# Patient Record
Sex: MALE | Race: WHITE | Employment: OTHER | URBAN - METROPOLITAN AREA
[De-identification: names, ages, dates, MRNs, and addresses within clinical notes are randomized per-mention and may not be internally consistent; named-entity substitution may affect disease eponyms.]

---

## 2017-03-24 ENCOUNTER — HOSPITAL ENCOUNTER (EMERGENCY)
Age: 82
Discharge: HOME | End: 2017-03-24
Attending: EMERGENCY MEDICINE
Payer: MEDICARE

## 2017-03-24 ENCOUNTER — APPOINTMENT (OUTPATIENT)
Dept: CT IMAGING | Age: 82
End: 2017-03-24
Attending: EMERGENCY MEDICINE
Payer: MEDICARE

## 2017-03-24 VITALS
HEART RATE: 71 BPM | TEMPERATURE: 97.2 F | DIASTOLIC BLOOD PRESSURE: 81 MMHG | OXYGEN SATURATION: 99 % | BODY MASS INDEX: 26.07 KG/M2 | SYSTOLIC BLOOD PRESSURE: 145 MMHG | WEIGHT: 176 LBS | HEIGHT: 69 IN | RESPIRATION RATE: 16 BRPM

## 2017-03-24 DIAGNOSIS — M25.511 ACUTE PAIN OF RIGHT SHOULDER: Primary | ICD-10-CM

## 2017-03-24 LAB
ALBUMIN SERPL BCP-MCNC: 3.5 G/DL (ref 3.5–5)
ALBUMIN/GLOB SERPL: 1.1 {RATIO} (ref 1.1–2.2)
ALP SERPL-CCNC: 58 U/L (ref 45–117)
ALT SERPL-CCNC: 18 U/L (ref 12–78)
ANION GAP BLD CALC-SCNC: 6 MMOL/L (ref 5–15)
AST SERPL W P-5'-P-CCNC: 14 U/L (ref 15–37)
BASOPHILS # BLD AUTO: 0 K/UL (ref 0–0.1)
BASOPHILS # BLD: 1 % (ref 0–1)
BILIRUB SERPL-MCNC: 0.5 MG/DL (ref 0.2–1)
BUN SERPL-MCNC: 18 MG/DL (ref 6–20)
BUN/CREAT SERPL: 15 (ref 12–20)
CALCIUM SERPL-MCNC: 8.9 MG/DL (ref 8.5–10.1)
CHLORIDE SERPL-SCNC: 102 MMOL/L (ref 97–108)
CO2 SERPL-SCNC: 31 MMOL/L (ref 21–32)
CREAT SERPL-MCNC: 1.21 MG/DL (ref 0.7–1.3)
EOSINOPHIL # BLD: 0.1 K/UL (ref 0–0.4)
EOSINOPHIL NFR BLD: 2 % (ref 0–7)
ERYTHROCYTE [DISTWIDTH] IN BLOOD BY AUTOMATED COUNT: 14.7 % (ref 11.5–14.5)
GLOBULIN SER CALC-MCNC: 3.2 G/DL (ref 2–4)
GLUCOSE SERPL-MCNC: 95 MG/DL (ref 65–100)
HCT VFR BLD AUTO: 46 % (ref 36.6–50.3)
HGB BLD-MCNC: 14.7 G/DL (ref 12.1–17)
LYMPHOCYTES # BLD AUTO: 13 % (ref 12–49)
LYMPHOCYTES # BLD: 0.8 K/UL (ref 0.8–3.5)
MCH RBC QN AUTO: 29.1 PG (ref 26–34)
MCHC RBC AUTO-ENTMCNC: 32 G/DL (ref 30–36.5)
MCV RBC AUTO: 90.9 FL (ref 80–99)
MONOCYTES # BLD: 0.6 K/UL (ref 0–1)
MONOCYTES NFR BLD AUTO: 11 % (ref 5–13)
NEUTS SEG # BLD: 4.4 K/UL (ref 1.8–8)
NEUTS SEG NFR BLD AUTO: 73 % (ref 32–75)
PLATELET # BLD AUTO: 161 K/UL (ref 150–400)
POTASSIUM SERPL-SCNC: 4.5 MMOL/L (ref 3.5–5.1)
PROT SERPL-MCNC: 6.7 G/DL (ref 6.4–8.2)
RBC # BLD AUTO: 5.06 M/UL (ref 4.1–5.7)
SODIUM SERPL-SCNC: 139 MMOL/L (ref 136–145)
TROPONIN I SERPL-MCNC: <0.04 NG/ML
TROPONIN I SERPL-MCNC: <0.04 NG/ML
WBC # BLD AUTO: 5.9 K/UL (ref 4.1–11.1)

## 2017-03-24 PROCEDURE — 84484 ASSAY OF TROPONIN QUANT: CPT | Performed by: EMERGENCY MEDICINE

## 2017-03-24 PROCEDURE — 74011636320 HC RX REV CODE- 636/320: Performed by: EMERGENCY MEDICINE

## 2017-03-24 PROCEDURE — 99283 EMERGENCY DEPT VISIT LOW MDM: CPT

## 2017-03-24 PROCEDURE — 36415 COLL VENOUS BLD VENIPUNCTURE: CPT | Performed by: EMERGENCY MEDICINE

## 2017-03-24 PROCEDURE — 93005 ELECTROCARDIOGRAM TRACING: CPT

## 2017-03-24 PROCEDURE — 80053 COMPREHEN METABOLIC PANEL: CPT | Performed by: EMERGENCY MEDICINE

## 2017-03-24 PROCEDURE — 71275 CT ANGIOGRAPHY CHEST: CPT

## 2017-03-24 PROCEDURE — 74011000258 HC RX REV CODE- 258: Performed by: EMERGENCY MEDICINE

## 2017-03-24 PROCEDURE — 85025 COMPLETE CBC W/AUTO DIFF WBC: CPT | Performed by: EMERGENCY MEDICINE

## 2017-03-24 RX ORDER — HYDROCODONE BITARTRATE AND ACETAMINOPHEN 5; 325 MG/1; MG/1
1 TABLET ORAL
Qty: 20 TAB | Refills: 0 | Status: SHIPPED | OUTPATIENT
Start: 2017-03-24 | End: 2017-11-30

## 2017-03-24 RX ORDER — SODIUM CHLORIDE 0.9 % (FLUSH) 0.9 %
10 SYRINGE (ML) INJECTION
Status: COMPLETED | OUTPATIENT
Start: 2017-03-24 | End: 2017-03-24

## 2017-03-24 RX ADMIN — SODIUM CHLORIDE 100 ML: 900 INJECTION, SOLUTION INTRAVENOUS at 18:39

## 2017-03-24 RX ADMIN — IOPAMIDOL 100 ML: 755 INJECTION, SOLUTION INTRAVENOUS at 18:39

## 2017-03-24 RX ADMIN — Medication 10 ML: at 18:39

## 2017-03-24 NOTE — ED TRIAGE NOTES
TRIAGE NOTE: \"I was riding in the car and all of a sudden I had this pain run across my right shoulder to my shoulder blades. It's never happened before. \"

## 2017-03-24 NOTE — ED PROVIDER NOTES
HPI Comments: 80 y.o. male with past medical history significant for peripheral neuropathy, recurrent herpes simplex, infarction of lymph nodes, prostate CA- prostatectomy, TIA, melanoma, pacemaker, hypercholesterolemia, CAD, seizure disorder; not active, nephrolithiasis, hypothyroidism, herniated lumbar, endoscopy, skull fx, green light procedure, cardiac catheterization who presents accompanied by spouse with chief complaint of shoulder pain. Patient states he was riding in the car with his spouse when he had sudden onset of right shoulder pain. He notes he only has pain with movement and reports exacerbation with deep breathing and ranging shoulder. He denies any hx of similar sx. Patient explains that he is usually very healthy and decided to get it \"checked out\" out of worry that he has \"never felt anything like it before\". Patient denies any SOB, fever, chills, visual disturbance, nausea, vomiting, dizziness, HA, neck pain, cough, congestion. There are no other acute medical concerns at this time. Social hx: non-smoker. Denies ETOH use. PCP: Javed Sweet MD    Note written by Kartik Quinn, as dictated by Nilay Jay MD 6:15 PM      The history is provided by the patient and the spouse. No  was used. Past Medical History:   Diagnosis Date    Artificial pacemaker     (atrial)  currently NSR    CAD (coronary artery disease)     ECG: old myocardial infarction 4/23/12    inferior Q's, NSR    Exercise habits 2012    runs in [place] daily    H/O echocardiogram 7/2011    EF= 50-55%, nl. fxn.     Hypercholesterolemia 1992    Hypothyroidism     Infarction of lymph node/vessel [id number]    WIFE UNSURE OF THIS STATES HAS STONE IN SALIVARY GLAND    Lumbar herniated disc     Melanoma ([address] 1991    chest    Nephrolithiasis 4/2010    Peripheral neuropathy ([address] 4/21/2010    Prostate cancer ([address] 2005    TURP/seeds     Recurrent herpes simplex 4/21/2010    Seizure disorder (?) 4/21/2010    not active NOT HAD SEIZURE IN 50 YEARS PER WIFE    TIA (?) 7/2011    fainting spell >> pacemaker       Past Surgical History:   Procedure Laterality Date    CARDIAC SURG PROCEDURE UNLIST      HOLE IN HEART FIXED ASD    ENDOSCOPY, COLON, DIAGNOSTIC  9/05    HX CATARACT REMOVAL      LUCIA.  HX HEART CATHETERIZATION      3 stents    HX PACEMAKER  2011    sss ppm st judes    HX PROSTATECTOMY  3/05    green light TURP/RADIOACTIVE SEEDS    HX TONSILLECTOMY      HX UROLOGICAL      urinary stricture    NEUROLOGICAL PROCEDURE UNLISTED  age 12    skull fracture         Family History:   Problem Relation Age of Onset    Heart Disease Mother      MI   [place] Amen Hypertension Father     Anesth Problems Neg Hx        Social History     Social History    Marital status:      Spouse name: N/A    Number of children: N/A    Years of education: N/A     Occupational History    Not on file. Social History Main Topics    Smoking status: Never Smoker    Smokeless tobacco: Never Used    Alcohol use No    Drug use: Not on file    Sexual activity: Not on file     Other Topics Concern    Not on file     Social History Narrative         ALLERGIES: Review of patient's allergies indicates no known allergies. Review of Systems   Constitutional: Negative for activity change, appetite change, chills, fatigue and fever. HENT: Negative for ear pain, facial swelling, sore throat and trouble swallowing. Eyes: Negative for pain, discharge and visual disturbance. Respiratory: Negative for cough, chest tightness, shortness of breath and wheezing. Cardiovascular: Negative for chest pain and palpitations. Gastrointestinal: Negative for abdominal pain, blood in stool, nausea and vomiting. Genitourinary: Negative for difficulty urinating, flank pain and hematuria. Musculoskeletal: Positive for arthralgias (right shoulder). Negative for joint swelling, myalgias and neck pain.    Skin: Negative for color change and rash. Neurological: Negative for dizziness, weakness, numbness and headaches. Hematological: Negative for adenopathy. Does not bruise/bleed easily. Psychiatric/Behavioral: Negative for behavioral problems, confusion and sleep disturbance. All other systems reviewed and are negative. Vitals:    03/24/17 1656   BP: 145/81   Pulse: 71   Resp: 16   Temp: 97.2 °F (36.2 °C)   SpO2: 95%   Weight: 79.8 kg (176 lb)   Height: 5' 9\" (1.753 m)            Physical Exam   Constitutional: He is oriented to person, place, and time. He appears well-developed and well-nourished. No distress. HENT:   Head: Normocephalic and atraumatic. Nose: Nose normal.   Mouth/Throat: Oropharynx is clear and moist.   Eyes: Conjunctivae and EOM are normal. Pupils are equal, round, and reactive to light. No scleral icterus. Neck: Normal range of motion. Neck supple. No JVD present. No tracheal deviation present. No thyromegaly present. No carotid bruits noted. Cardiovascular: Normal rate, regular rhythm, normal heart sounds and intact distal pulses. Exam reveals no gallop and no friction rub. No murmur heard. Pulmonary/Chest: Effort normal and breath sounds normal. No respiratory distress. He has no wheezes. He has no rales. He exhibits no tenderness. Pulses are equal bilaterally. Abdominal: Soft. Bowel sounds are normal. He exhibits no distension and no mass. There is no tenderness. There is no rebound and no guarding. Musculoskeletal: Normal range of motion. He exhibits no edema or tenderness. Unable to reproduce shoulder pain with palpation   Lymphadenopathy:     He has no cervical adenopathy. Neurological: He is alert and oriented to person, place, and time. He has normal reflexes. No cranial nerve deficit. Coordination normal.   Skin: Skin is warm and dry. No rash noted. No erythema. Psychiatric: He has a normal mood and affect.  His behavior is normal. Judgment and thought content normal.   Nursing note and vitals reviewed. Note written by Anabel Soria, as dictated by Son Valdovinos MD 6:15 PM        MDM  Number of Diagnoses or Management Options  Acute pain of right shoulder: new and requires workup     Amount and/or Complexity of Data Reviewed  Clinical lab tests: ordered and reviewed  Tests in the radiology section of CPT®: ordered and reviewed  Decide to obtain previous medical records or to obtain history from someone other than the patient: yes  Review and summarize past medical records: yes  Discuss the patient with other providers: yes  Independent visualization of images, tracings, or specimens: yes    Risk of Complications, Morbidity, and/or Mortality  Presenting problems: high  Diagnostic procedures: high  Management options: high    Patient Progress  Patient progress: stable    ED Course       Procedures    ED EKG interpretation:  Rhythm: paced; and regular . Rate (approx.): 70; Axis: left axis deviation; normal intervals. ST/T wave: non-specific changes; poor R wave. No acute changes. Note written by Anabel Soria, as dictated by Son Valdovinos MD 6:16 PM    PROGRESS NOTE:    Discussed the CT with the radiologist. Will do the PE study to look for both clot and dissection. 7:23 PM  CT normal. Will repeat Troponin. If repeat Troponin is normal, will discharge with treatment for MS pain. 8:15 PM  Still awaiting Troponin report. the repeat troponin was normal. Patient will be discharged home with symptomatic treatment and further evaluation by his own physician And return if any worsening symptoms.

## 2017-03-24 NOTE — Clinical Note
Home to rest and use the medications as directed for pain. Warm moist compresses to the areas that are painful four times a day for 20 minutes at a time. See your own MD if continued difficulty.

## 2017-03-25 LAB
ATRIAL RATE: 68 BPM
CALCULATED P AXIS, ECG09: -2 DEGREES
CALCULATED R AXIS, ECG10: -32 DEGREES
CALCULATED T AXIS, ECG11: -19 DEGREES
DIAGNOSIS, 93000: NORMAL
P-R INTERVAL, ECG05: 238 MS
Q-T INTERVAL, ECG07: 424 MS
QRS DURATION, ECG06: 92 MS
QTC CALCULATION (BEZET), ECG08: 457 MS
VENTRICULAR RATE, ECG03: 70 BPM

## 2017-11-30 ENCOUNTER — APPOINTMENT (OUTPATIENT)
Dept: CT IMAGING | Age: 82
DRG: 689 | End: 2017-11-30
Attending: STUDENT IN AN ORGANIZED HEALTH CARE EDUCATION/TRAINING PROGRAM
Payer: MEDICARE

## 2017-11-30 ENCOUNTER — HOSPITAL ENCOUNTER (INPATIENT)
Age: 82
LOS: 4 days | Discharge: NURSING, REHAB OR LONG-TERM CARE | DRG: 689 | End: 2017-12-04
Attending: STUDENT IN AN ORGANIZED HEALTH CARE EDUCATION/TRAINING PROGRAM | Admitting: HOSPITALIST
Payer: MEDICARE

## 2017-11-30 DIAGNOSIS — M54.5 LOW BACK PAIN, UNSPECIFIED BACK PAIN LATERALITY, UNSPECIFIED CHRONICITY, WITH SCIATICA PRESENCE UNSPECIFIED: ICD-10-CM

## 2017-11-30 DIAGNOSIS — N39.0 URINARY TRACT INFECTION WITHOUT HEMATURIA, SITE UNSPECIFIED: Primary | ICD-10-CM

## 2017-11-30 DIAGNOSIS — R53.81 DEBILITY: ICD-10-CM

## 2017-11-30 LAB
ALBUMIN SERPL-MCNC: 3.2 G/DL (ref 3.5–5)
ALBUMIN/GLOB SERPL: 0.9 {RATIO} (ref 1.1–2.2)
ALP SERPL-CCNC: 57 U/L (ref 45–117)
ALT SERPL-CCNC: 18 U/L (ref 12–78)
ANION GAP SERPL CALC-SCNC: 5 MMOL/L (ref 5–15)
APPEARANCE UR: ABNORMAL
APTT PPP: 33.6 SEC (ref 22.1–32.5)
AST SERPL-CCNC: 20 U/L (ref 15–37)
ATRIAL RATE: 70 BPM
BACTERIA URNS QL MICRO: ABNORMAL /HPF
BASOPHILS # BLD: 0 K/UL (ref 0–0.1)
BASOPHILS NFR BLD: 0 % (ref 0–1)
BILIRUB SERPL-MCNC: 0.5 MG/DL (ref 0.2–1)
BILIRUB UR QL: NEGATIVE
BUN SERPL-MCNC: 14 MG/DL (ref 6–20)
BUN/CREAT SERPL: 13 (ref 12–20)
CALCIUM SERPL-MCNC: 8.7 MG/DL (ref 8.5–10.1)
CALCULATED P AXIS, ECG09: -29 DEGREES
CALCULATED R AXIS, ECG10: -31 DEGREES
CALCULATED T AXIS, ECG11: -29 DEGREES
CHLORIDE SERPL-SCNC: 102 MMOL/L (ref 97–108)
CO2 SERPL-SCNC: 31 MMOL/L (ref 21–32)
COLOR UR: ABNORMAL
CREAT SERPL-MCNC: 1.04 MG/DL (ref 0.7–1.3)
DIAGNOSIS, 93000: NORMAL
DIFFERENTIAL METHOD BLD: ABNORMAL
EOSINOPHIL # BLD: 0.1 K/UL (ref 0–0.4)
EOSINOPHIL NFR BLD: 1 % (ref 0–7)
EPITH CASTS URNS QL MICRO: ABNORMAL /LPF
ERYTHROCYTE [DISTWIDTH] IN BLOOD BY AUTOMATED COUNT: 14.2 % (ref 11.5–14.5)
GLOBULIN SER CALC-MCNC: 3.4 G/DL (ref 2–4)
GLUCOSE SERPL-MCNC: 100 MG/DL (ref 65–100)
GLUCOSE UR STRIP.AUTO-MCNC: NEGATIVE MG/DL
HCT VFR BLD AUTO: 46.7 % (ref 36.6–50.3)
HGB BLD-MCNC: 15.3 G/DL (ref 12.1–17)
HGB UR QL STRIP: ABNORMAL
INR PPP: 1.1 (ref 0.9–1.1)
KETONES UR QL STRIP.AUTO: NEGATIVE MG/DL
LEUKOCYTE ESTERASE UR QL STRIP.AUTO: ABNORMAL
LIPASE SERPL-CCNC: 123 U/L (ref 73–393)
LYMPHOCYTES # BLD: 0.7 K/UL (ref 0.8–3.5)
LYMPHOCYTES NFR BLD: 5 % (ref 12–49)
MCH RBC QN AUTO: 30.1 PG (ref 26–34)
MCHC RBC AUTO-ENTMCNC: 32.8 G/DL (ref 30–36.5)
MCV RBC AUTO: 91.9 FL (ref 80–99)
MONOCYTES # BLD: 1.2 K/UL (ref 0–1)
MONOCYTES NFR BLD: 9 % (ref 5–13)
NEUTS SEG # BLD: 11 K/UL (ref 1.8–8)
NEUTS SEG NFR BLD: 85 % (ref 32–75)
NITRITE UR QL STRIP.AUTO: POSITIVE
P-R INTERVAL, ECG05: 236 MS
PH UR STRIP: 6.5 [PH] (ref 5–8)
PLATELET # BLD AUTO: 158 K/UL (ref 150–400)
POTASSIUM SERPL-SCNC: 4.4 MMOL/L (ref 3.5–5.1)
PROT SERPL-MCNC: 6.6 G/DL (ref 6.4–8.2)
PROT UR STRIP-MCNC: NEGATIVE MG/DL
PROTHROMBIN TIME: 10.9 SEC (ref 9–11.1)
Q-T INTERVAL, ECG07: 420 MS
QRS DURATION, ECG06: 92 MS
QTC CALCULATION (BEZET), ECG08: 453 MS
RBC # BLD AUTO: 5.08 M/UL (ref 4.1–5.7)
RBC #/AREA URNS HPF: ABNORMAL /HPF (ref 0–5)
RBC MORPH BLD: ABNORMAL
SODIUM SERPL-SCNC: 138 MMOL/L (ref 136–145)
SP GR UR REFRACTOMETRY: 1.01 (ref 1–1.03)
THERAPEUTIC RANGE,PTTT: ABNORMAL SECS (ref 58–77)
TROPONIN I SERPL-MCNC: <0.04 NG/ML
UR CULT HOLD, URHOLD: NORMAL
UROBILINOGEN UR QL STRIP.AUTO: 0.2 EU/DL (ref 0.2–1)
VENTRICULAR RATE, ECG03: 70 BPM
WBC # BLD AUTO: 13 K/UL (ref 4.1–11.1)
WBC URNS QL MICRO: ABNORMAL /HPF (ref 0–4)

## 2017-11-30 PROCEDURE — 74011250637 HC RX REV CODE- 250/637: Performed by: HOSPITALIST

## 2017-11-30 PROCEDURE — 81001 URINALYSIS AUTO W/SCOPE: CPT | Performed by: STUDENT IN AN ORGANIZED HEALTH CARE EDUCATION/TRAINING PROGRAM

## 2017-11-30 PROCEDURE — 96361 HYDRATE IV INFUSION ADD-ON: CPT

## 2017-11-30 PROCEDURE — 80053 COMPREHEN METABOLIC PANEL: CPT | Performed by: STUDENT IN AN ORGANIZED HEALTH CARE EDUCATION/TRAINING PROGRAM

## 2017-11-30 PROCEDURE — G8978 MOBILITY CURRENT STATUS: HCPCS

## 2017-11-30 PROCEDURE — 87077 CULTURE AEROBIC IDENTIFY: CPT | Performed by: STUDENT IN AN ORGANIZED HEALTH CARE EDUCATION/TRAINING PROGRAM

## 2017-11-30 PROCEDURE — 97530 THERAPEUTIC ACTIVITIES: CPT

## 2017-11-30 PROCEDURE — 85730 THROMBOPLASTIN TIME PARTIAL: CPT | Performed by: STUDENT IN AN ORGANIZED HEALTH CARE EDUCATION/TRAINING PROGRAM

## 2017-11-30 PROCEDURE — 99285 EMERGENCY DEPT VISIT HI MDM: CPT

## 2017-11-30 PROCEDURE — 87186 SC STD MICRODIL/AGAR DIL: CPT | Performed by: STUDENT IN AN ORGANIZED HEALTH CARE EDUCATION/TRAINING PROGRAM

## 2017-11-30 PROCEDURE — G8979 MOBILITY GOAL STATUS: HCPCS

## 2017-11-30 PROCEDURE — 96365 THER/PROPH/DIAG IV INF INIT: CPT

## 2017-11-30 PROCEDURE — 85025 COMPLETE CBC W/AUTO DIFF WBC: CPT | Performed by: STUDENT IN AN ORGANIZED HEALTH CARE EDUCATION/TRAINING PROGRAM

## 2017-11-30 PROCEDURE — 72131 CT LUMBAR SPINE W/O DYE: CPT

## 2017-11-30 PROCEDURE — 74011000258 HC RX REV CODE- 258: Performed by: STUDENT IN AN ORGANIZED HEALTH CARE EDUCATION/TRAINING PROGRAM

## 2017-11-30 PROCEDURE — 51701 INSERT BLADDER CATHETER: CPT

## 2017-11-30 PROCEDURE — 97161 PT EVAL LOW COMPLEX 20 MIN: CPT

## 2017-11-30 PROCEDURE — 83690 ASSAY OF LIPASE: CPT | Performed by: STUDENT IN AN ORGANIZED HEALTH CARE EDUCATION/TRAINING PROGRAM

## 2017-11-30 PROCEDURE — 36415 COLL VENOUS BLD VENIPUNCTURE: CPT | Performed by: STUDENT IN AN ORGANIZED HEALTH CARE EDUCATION/TRAINING PROGRAM

## 2017-11-30 PROCEDURE — 74011250636 HC RX REV CODE- 250/636: Performed by: STUDENT IN AN ORGANIZED HEALTH CARE EDUCATION/TRAINING PROGRAM

## 2017-11-30 PROCEDURE — 85610 PROTHROMBIN TIME: CPT | Performed by: STUDENT IN AN ORGANIZED HEALTH CARE EDUCATION/TRAINING PROGRAM

## 2017-11-30 PROCEDURE — 84484 ASSAY OF TROPONIN QUANT: CPT | Performed by: STUDENT IN AN ORGANIZED HEALTH CARE EDUCATION/TRAINING PROGRAM

## 2017-11-30 PROCEDURE — 65270000032 HC RM SEMIPRIVATE

## 2017-11-30 PROCEDURE — 93005 ELECTROCARDIOGRAM TRACING: CPT

## 2017-11-30 PROCEDURE — 77030011943

## 2017-11-30 PROCEDURE — 87086 URINE CULTURE/COLONY COUNT: CPT | Performed by: STUDENT IN AN ORGANIZED HEALTH CARE EDUCATION/TRAINING PROGRAM

## 2017-11-30 PROCEDURE — 96375 TX/PRO/DX INJ NEW DRUG ADDON: CPT

## 2017-11-30 PROCEDURE — 74011250636 HC RX REV CODE- 250/636: Performed by: HOSPITALIST

## 2017-11-30 RX ORDER — LEVOTHYROXINE SODIUM 50 UG/1
50 TABLET ORAL
COMMUNITY

## 2017-11-30 RX ORDER — OXYBUTYNIN CHLORIDE 5 MG/1
5 TABLET, EXTENDED RELEASE ORAL
Status: DISCONTINUED | OUTPATIENT
Start: 2017-11-30 | End: 2017-12-04 | Stop reason: HOSPADM

## 2017-11-30 RX ORDER — SODIUM CHLORIDE 0.9 % (FLUSH) 0.9 %
5-10 SYRINGE (ML) INJECTION AS NEEDED
Status: DISCONTINUED | OUTPATIENT
Start: 2017-11-30 | End: 2017-12-04 | Stop reason: HOSPADM

## 2017-11-30 RX ORDER — HYDROMORPHONE HYDROCHLORIDE 2 MG/ML
0.5 INJECTION, SOLUTION INTRAMUSCULAR; INTRAVENOUS; SUBCUTANEOUS ONCE
Status: COMPLETED | OUTPATIENT
Start: 2017-11-30 | End: 2017-11-30

## 2017-11-30 RX ORDER — LEVOTHYROXINE SODIUM 50 UG/1
100 TABLET ORAL
Status: DISCONTINUED | OUTPATIENT
Start: 2017-12-01 | End: 2017-12-04 | Stop reason: HOSPADM

## 2017-11-30 RX ORDER — MEMANTINE HYDROCHLORIDE 10 MG/1
10 TABLET ORAL 2 TIMES DAILY
Status: DISCONTINUED | OUTPATIENT
Start: 2017-11-30 | End: 2017-12-04 | Stop reason: HOSPADM

## 2017-11-30 RX ORDER — SODIUM CHLORIDE 0.9 % (FLUSH) 0.9 %
5-10 SYRINGE (ML) INJECTION EVERY 8 HOURS
Status: DISCONTINUED | OUTPATIENT
Start: 2017-11-30 | End: 2017-12-04 | Stop reason: HOSPADM

## 2017-11-30 RX ORDER — LEVOTHYROXINE SODIUM 50 UG/1
100 TABLET ORAL
COMMUNITY

## 2017-11-30 RX ORDER — SIMVASTATIN 10 MG/1
10 TABLET, FILM COATED ORAL
COMMUNITY

## 2017-11-30 RX ORDER — CAPSAICIN 0.03 G/100G
CREAM TOPICAL 3 TIMES DAILY
Status: DISCONTINUED | OUTPATIENT
Start: 2017-11-30 | End: 2017-12-04 | Stop reason: HOSPADM

## 2017-11-30 RX ORDER — SIMVASTATIN 10 MG/1
10 TABLET, FILM COATED ORAL
Status: DISCONTINUED | OUTPATIENT
Start: 2017-11-30 | End: 2017-12-04 | Stop reason: HOSPADM

## 2017-11-30 RX ORDER — ASPIRIN 81 MG/1
81 TABLET ORAL DAILY
COMMUNITY

## 2017-11-30 RX ORDER — LEVOTHYROXINE SODIUM 50 UG/1
50 TABLET ORAL
Status: DISCONTINUED | OUTPATIENT
Start: 2017-12-02 | End: 2017-12-04 | Stop reason: HOSPADM

## 2017-11-30 RX ORDER — DONEPEZIL HYDROCHLORIDE 10 MG/1
10 TABLET, FILM COATED ORAL
Status: DISCONTINUED | OUTPATIENT
Start: 2017-12-01 | End: 2017-12-01

## 2017-11-30 RX ORDER — SODIUM CHLORIDE 9 MG/ML
75 INJECTION, SOLUTION INTRAVENOUS CONTINUOUS
Status: DISCONTINUED | OUTPATIENT
Start: 2017-11-30 | End: 2017-12-01

## 2017-11-30 RX ORDER — ACETAMINOPHEN 500 MG
500 TABLET ORAL
Status: DISCONTINUED | OUTPATIENT
Start: 2017-11-30 | End: 2017-12-04 | Stop reason: HOSPADM

## 2017-11-30 RX ORDER — SOTALOL HYDROCHLORIDE 80 MG/1
80 TABLET ORAL 2 TIMES DAILY
Status: DISCONTINUED | OUTPATIENT
Start: 2017-11-30 | End: 2017-12-04 | Stop reason: HOSPADM

## 2017-11-30 RX ORDER — ASPIRIN 81 MG/1
81 TABLET ORAL DAILY
Status: DISCONTINUED | OUTPATIENT
Start: 2017-12-01 | End: 2017-12-04 | Stop reason: HOSPADM

## 2017-11-30 RX ADMIN — HYDROMORPHONE HYDROCHLORIDE 0.5 MG: 2 INJECTION INTRAMUSCULAR; INTRAVENOUS; SUBCUTANEOUS at 08:21

## 2017-11-30 RX ADMIN — CEFTRIAXONE 1 G: 1 INJECTION, POWDER, FOR SOLUTION INTRAMUSCULAR; INTRAVENOUS at 10:21

## 2017-11-30 RX ADMIN — SIMVASTATIN 10 MG: 10 TABLET, FILM COATED ORAL at 22:30

## 2017-11-30 RX ADMIN — Medication 10 ML: at 22:30

## 2017-11-30 RX ADMIN — APIXABAN 5 MG: 5 TABLET, FILM COATED ORAL at 22:30

## 2017-11-30 RX ADMIN — MEMANTINE 10 MG: 10 TABLET ORAL at 18:01

## 2017-11-30 RX ADMIN — ACETAMINOPHEN 500 MG: 500 TABLET, FILM COATED ORAL at 18:02

## 2017-11-30 RX ADMIN — Medication 10 ML: at 13:14

## 2017-11-30 RX ADMIN — CAPSAICIN: 0.25 CREAM TOPICAL at 17:02

## 2017-11-30 RX ADMIN — SODIUM CHLORIDE 500 ML: 900 INJECTION, SOLUTION INTRAVENOUS at 08:21

## 2017-11-30 RX ADMIN — SOTALOL HYDROCHLORIDE 80 MG: 80 TABLET ORAL at 18:04

## 2017-11-30 RX ADMIN — CAPSAICIN: 0.25 CREAM TOPICAL at 22:41

## 2017-11-30 RX ADMIN — SODIUM CHLORIDE 75 ML/HR: 900 INJECTION, SOLUTION INTRAVENOUS at 13:15

## 2017-11-30 RX ADMIN — OXYBUTYNIN CHLORIDE 5 MG: 5 TABLET, EXTENDED RELEASE ORAL at 22:30

## 2017-11-30 NOTE — ED NOTES
Pt brief noted to be soiled with urine, pt's brief changed, pericare provided, pt repositioned in stretcher and provided with pillows for comfort. Pt O2 sats noted to be 88% on RA after administration of dilaudid. Pt placed on 2L NC and sats up to 95%.

## 2017-11-30 NOTE — ED TRIAGE NOTES
Pt from home via EMS for evaluation of lower back pain for two days, no reported fall. Per family pt has hx of herniated discs.

## 2017-11-30 NOTE — PROGRESS NOTES
Problem: Mobility Impaired (Adult and Pediatric)  Goal: *Acute Goals and Plan of Care (Insert Text)  Physical Therapy Goals  Initiated 11/30/2017  1. Patient will move from supine to sit and sit to supine  in bed with moderate assistance  within 7 day(s). 2.  Patient will transfer from bed to chair and chair to bed with minimal assistance  using the least restrictive device within 7 day(s). 3.  Patient will perform sit to stand with minimal assistance within 7 day(s). 4.  Patient will ambulate with minimal assistance/contact guard assist for 50 feet with the least restrictive device within 7 day(s). physical Therapy Emergency Department EVALUATION with recommended admission  Patient: Umang Leblanc (53 y.o. male)  Date: 11/30/2017  Primary Diagnosis: There are no admission diagnoses documented for this encounter. Precautions: [place], deaf in R ear, hearing aid in the L     ASSESSMENT :  Chart reviewed. Patient cleared to be seen by nursing staff. Patient presents to the ED s/o a few days of declining functional status, back pain, and reports of R leg pain. Ct of the lumbar spine revealing: Severe degenerative changes of all lumbar disc levels. This is most significant at L5-S1 with moderate left foraminal narrowing which has slightly  progressed. No acute fracture. Wife works during that day and patient sleeps and spends his days in the recliner. Pt also has an elevated white count and new UTI. Wife noticing that patient is not getting up anymore whereas he previously ambulated with the RW to the kitchen and bathroom as needed mod independently. Based on the objective data described below, the patient presents with increased confusion, poor motor planning, increased fall risk, and decline in functional independence. Speaking loudly and into the R ear, patient having difficulty with command following. Patient demonstrating good strength by performing SLR and heelslides in supine on both legs.  No pain reported. However when attempting bed mobility, patient unable to lift his legs to bring them towards the EOB, even with cueing, prompting and additional time. Patient is max assist supine to sit. Initially pushing retropulsively in sitting needing constant support. Unable to coordinate using UEs to push forward to get feet on the floor. However once assisted into optimal seated position, patient able to sit without assist. Patient becoming frustrated with cueing and education. [place] Serum supported as patient pulled to standing with mod/max assist. Patient leaning posteriorly needing constant assist. Focus on anterior weight shifting in preparation for gait with minimal effect. Unable to clear feet during stepping attempt. Max assist x 2 to recliner where patient feels more comfortable sitting up rather than being supine. Wife present to monitor and becoming tearful during treatment. Patient and wife reassured. Patient is not safe to return home at this functional level. MD and RN notified of findings. Recommend inpatient PT consult. Admission for this patient is recommended with continued acute therapy. PLAN :  Frequency/Duration: Pending admission, the patient will be followed by physical therapy 5 times a week to address goals.     If admitted, Recommendations and Planned Interventions:  [x]           Bed Mobility Training             []    Neuromuscular Re-Education  [x]           Transfer Training                   []    Orthotic/Prosthetic Training  [x]           Gait Training                         []    Modalities  [x]           Therapeutic Exercises           []    Edema Management/Control  [x]           Therapeutic Activities            []    Patient and Family Training/Education  []           Other (comment):      Discharge Recommendations:     []   Home with family  [x]   Skilled nursing facility  []   Admission to hospital with rehab likely needed  []   Inpatient rehab referral  []   Outpatient physical therapy referral  []   Other:    Further Equipment Recommendations for Discharge: none, has RW at home  []   Rolling walker with 5\" wheels  []   Crutches   []   Mac Rubins   []   Wheelchair   []   Other:     COMMUNICATION/EDUCATION:   Communication/Collaboration:  [x]   Fall prevention education was provided and the patient/caregiver indicated understanding. [x]   Patient/family have participated as able and agree with findings and recommendations. []   Patient is unable to participate in plan of care at this time. Findings and recommendations were discussed with: MD physician and Registered Nurse and        SUBJECTIVE:   Patient stated I just cant do what you're asking me to do! Nidhi Rey    OBJECTIVE DATA SUMMARY:   HISTORY:    Past Medical History:   Diagnosis Date    Artificial pacemaker     (atrial)  currently NSR    CAD (coronary artery disease)     ECG: old myocardial infarction 4/23/12    inferior Q's, NSR    [place]    H/O echocardiogram 7/2011    EF= 50-55%, nl. fxn.  Hypercholesterolemia 1992    Hypothyroidism     Infarction of lymph node/vessel [id number]    WIFE UNSURE OF THIS STATES HAS STONE IN SALIVARY GLAND    Lumbar herniated disc     Melanoma ([address] 1991    chest    Nephrolithiasis 4/2010    Peripheral neuropathy 4/21/2010    [facility] [address] 2005    TURP/seeds     Recurrent herpes simplex 4/21/2010    Seizure disorder (?) 4/21/2010    not active NOT HAD SEIZURE IN 50 YEARS PER WIFE    TIA (?) 7/2011    fainting spell >> pacemaker     Past Surgical History:   Procedure Laterality Date    CARDIAC SURG PROCEDURE UNLIST      HOLE IN HEART FIXED ASD    ENDOSCOPY, COLON, DIAGNOSTIC  9/05    HX CATARACT REMOVAL      LUCIA.     HX HEART CATHETERIZATION      3 stents    HX PACEMAKER  2011    sss ppm st judes    HX PROSTATECTOMY  3/05    green light TURP/RADIOACTIVE SEEDS    HX TONSILLECTOMY      HX UROLOGICAL      urinary stricture  NEUROLOGICAL PROCEDURE UNLISTED  age 12    skull fracture     Prior Level of Function/Home Situation: Pt lives at home with his wife. Patient sleeps in a recliner sitting on a donut cushion. Wife works during the day. At baseline, patient is able to stand with RW mod I from the recliner and ambulate into the bathroom or to the kitchen. Patient changes out his own brief and gets small snacks throughout the day. No falls reported. Does not drive or leave the home, except for appointments. Personal factors and/or comorbidities impacting plan of care:     Home Situation  Home Environment: Private residence  # Steps to Enter: 0  One/Two Story Residence: One story  Living Alone: No (home alone during the day)  Support Systems: Spouse/Significant Other/Partner  Patient Expects to be Discharged to[de-identified] Unknown  Current DME Used/Available at Home: Walker, rolling    EXAMINATION/PRESENTATION/DECISION MAKING:      Hearing: Auditory  Auditory Impairment: Hard of hearing, left side, Deaf (deaf in R ear )     Strength:    Strength: Generally decreased, functional   Equal on both sides. Able to perform heel slide and SLR through full ROM in supine against gravity  Good  strength and shoulder flexion    Tone & Sensation:   Tone: Normal  Sensation: Impaired (bilat foot neuropathy)      Coordination:  Coordination: Grossly decreased, non-functional    Functional Mobility:  Bed Mobility:  Supine to Sit: Maximum assistance;Assist x1  Sit to Supine: Maximum assistance;Assist x1  Scooting: Maximum assistance;Assist x2  Transfers:  Sit to Stand: Moderate assistance;Maximum assistance;Assist x1;Additional time; Adaptive equipment  Stand to Sit: Maximum assistance;Assist x1  Bed to Chair: Maximum assistance;Assist x2; Moderate assistance  Balance:   Sitting: Impaired  Sitting - Static: Good (unsupported)  Sitting - Dynamic: Fair (occasional)  Standing: Impaired  Standing - Static: Poor  Standing - Dynamic : Poor  Ambulation/Gait Training:   Unable to complete at this time. Patient shuffling feet for stand step transfer to the recliner with assist x 2    Special Tests:  Tinetti test:    Sitting Balance: 1  Arises: 0  Attempts to Rise: 0  Immediate Standing Balance: 0  Standing Balance: 0  Nudged: 0  Eyes Closed: 0  Turn 360 Degrees - Continuous/Discontinuous: 0  Turn 360 Degrees - Steady/Unsteady: 0  Sitting Down: 0  Balance Score: 1  Indication of Gait: 0  R Step Length/Height: 0  L Step Length/Height: 0  R Foot Clearance: 0  L Foot Clearance: 0  Step Symmetry: 0  Step Continuity: 0  Path: 0  Trunk: 0  Walking Time: 1  Gait Score: 1  Total Score: 2       Tinetti Test and G-code impairment scale:  Percentage of Impairment CH    0%   CI    1-19% CJ    20-39% CK    40-59% CL    60-79% CM    80-99% CN     100%   Tinetti  Score 0-28 28 23-27 17-22 12-16 6-11 1-5 0       Tinetti Tool Score Risk of Falls  <19 = High Fall Risk  19-24 = Moderate Fall Risk  25-28 = Low Fall Risk  Tinetti ME. Performance-Oriented Assessment of Mobility Problems in Elderly Patients. Lemon 66; [id number]. (Scoring Description: PT Bulletin Feb. 10, 1993)    Older adults: Kay Kaur et al, 2009; n = 1000 [facility] elderly evaluated with ABC, GABO, ADL, and IADL)  · Mean GABO score for males aged 69-68 years = 26.21(3.40)  · Mean GABO score for females age 69-68 years = 25.16(4.30)  · Mean GABO score for males over 80 years = 23.29(6.02)  · Mean GABO score for females over 80 years = 17.20(8.32)        In compliance with CMSs Claims Based Outcome Reporting, the following G-code set was chosen for this patient based on their primary functional limitation being treated: The outcome measure chosen to determine the severity of the functional limitation was the Tinetti with a score of 1/28 which was correlated with the impairment scale.     ? Mobility - Walking and Moving Around:     - CURRENT STATUS: CM - 80%-99% impaired, limited or restricted    - GOAL STATUS: CL - 60%-79% impaired, limited or restricted    - D/C STATUS:  ---------------To be determined---------------       Pain:  Pain Scale 1: Adult Nonverbal Pain Scale   Not reporting pain with mobility at this time     Activity Tolerance:   O2 stable on RA, >95%. Please refer to the flowsheet for vital signs taken during this treatment.   After treatment:   [x]   Patient left in no apparent distress sitting up in chair  []   Patient left in no apparent distress in bed  [x]   Call bell left within reach  [x]   Nursing notified  [x]   Caregiver present  []   Bed alarm activated        Thank you for this referral.  Maryann Ambriz PT, DPT   Time Calculation: 40 mins

## 2017-11-30 NOTE — H&P
HISTORY AND PHYSICAL      PCP: Rafy Loving MD  History source: the patient's wife      CC: fatigue      HPI: this is a 80 y.o man with multiple medical problems who presents with fatigue and back pain. He is lethargic and doesn't provide much history. Per his wife he has chronic low back pain, but for the past 3 days he has been complaining of it more. She also noted that he has become increasingly fatigued and lethargic, his PO intake has decreased. He is ambulatory with a walker at baseline but has been in a chair for most of the past 2 days. There has been no documented fever, no known dysuria or hematuria. She is unaware of any specific complaints from him. There are no exacerbating of alleviating factors of his symptoms. PMH/PSH:  Past Medical History:   Diagnosis Date    Artificial pacemaker     (atrial)  currently NSR    CAD (coronary artery disease)     ECG: old myocardial infarction 4/23/12    inferior Q's, NSR    Exercise habits 2012    runs in park daily    H/O echocardiogram 7/2011    EF= 50-55%, nl. fxn.  Hypercholesterolemia 1992    Hypothyroidism     Infarction of lymph node/vessel [id number]    WIFE UNSURE OF THIS STATES HAS STONE IN SALIVARY GLAND    Lumbar herniated disc     Melanoma ([address] 1991    chest    Nephrolithiasis 4/2010    Peripheral neuropathy 4/21/2010    Prostate cancer ([address] 2005    TURP/seeds     Recurrent herpes simplex 4/21/2010    Seizure disorder (?) 4/21/2010    not active NOT HAD SEIZURE IN 50 YEARS PER WIFE    TIA (?) 7/2011    fainting spell >> pacemaker     Past Surgical History:   Procedure Laterality Date    CARDIAC SURG PROCEDURE UNLIST      HOLE IN HEART FIXED ASD    ENDOSCOPY, COLON, DIAGNOSTIC  9/05    HX CATARACT REMOVAL      LUCIA.     HX HEART CATHETERIZATION      3 stents    HX PACEMAKER  2011    sss ppm st judes    HX PROSTATECTOMY  3/05    green light TURP/RADIOACTIVE SEEDS    HX TONSILLECTOMY      HX UROLOGICAL      urinary stricture    NEUROLOGICAL PROCEDURE UNLISTED  age 12    skull fracture       Home meds:   Prior to Admission medications    Medication Sig Start Date End Date Taking? Authorizing Provider   aspirin delayed-release 81 mg tablet Take 81 mg by mouth daily. Yes Historical Provider   levothyroxine (SYNTHROID) 50 mcg tablet Take 50 mcg by mouth five (5) days a week. (100 mcg on Mon+Fri; 50 mcg x 5 days weekly)   Yes Historical Provider   levothyroxine (SYNTHROID) 50 mcg tablet Take 100 mcg by mouth every Monday and Friday. (100 mcg on Mon+Fri; 50 mcg x 5 days weekly)   Yes Historical Provider   simvastatin (ZOCOR) 10 mg tablet Take 10 mg by mouth nightly. Yes Historical Provider   memantine (NAMENDA) 10 mg tablet Take 10 mg by mouth two (2) times a day. Yes Historical Provider   sotalol (SOTALOL AF) 80 mg tablet Take 1 Tab by mouth two (2) times a day. 11/11/16  Yes Dakota Wills MD   apixaban (ELIQUIS) 5 mg tablet Take 5 mg by mouth every twelve (12) hours. Yes Historical Provider   donepezil (ARICEPT) 10 mg tablet Take 10 mg by mouth daily (with breakfast). Yes Historical Provider   tolterodine (DETROL) 2 mg tablet Take 2 mg by mouth nightly.  Indications: URINARY URGE INCONTINENCE   Yes Historical Provider       Allergies:  No Known Allergies    FH:  Family History   Problem Relation Age of Onset    Heart Disease Mother      MI   [facility] Hypertension Father     Anesth Problems Neg Hx        SH:  Social History   Substance Use Topics    Smoking status: Never Smoker    Smokeless tobacco: Never Used    Alcohol use No       ROS: A comprehensive review of systems was negative except for: ROS was limited due to patient's clinical condition      PHYSICAL EXAM:  Visit Vitals    BP 98/60    Pulse 70    Temp 97.9 °F (36.6 °C)    Resp 14    Ht 5' 8\" (1.727 m)    Wt 73 kg (161 lb)    SpO2 96%    BMI 24.48 kg/m2       Gen: NAD  HEENT: anicteric sclerae, normal conjunctiva, oropharynx clear, MM dry  Neck: supple, trachea midline, no adenopathy  Heart: RRR, no MRG, no JVD, no peripheral edema  Lungs: CTA b/l, non-labored respirations  Abd: soft, NT, ND, BS+, no organomegaly  Extr: warm  Skin: dry, multiple ecchymoses on upper extremities  Neuro: CN II-XII grossly intact, lethargic  Psych: not anxious nor agitated      Labs/Imaging:  Recent Labs      11/30/17   0829   WBC  13.0*   HGB  15.3   HCT  46.7   PLT  158     Recent Labs      11/30/17   0829   NA  138   K  4.4   CL  102   CO2  31   BUN  14   CREA  1.04   GLU  100   CA  8.7     Recent Labs      11/30/17 0829   SGOT  20   ALT  18   AP  57   TBILI  0.5   TP  6.6   ALB  3.2*   GLOB  3.4   LPSE  123       Recent Labs      11/30/17 0829   TROIQ  <0.04       Recent Labs      11/30/17 0829   INR  1.1   PTP  10.9   APTT  33.6*        CT spine:  1. Severe degenerative changes of all lumbar disc levels. This is most significant at L5-S1 with moderate left foraminal narrowing which has slightly progressed. No acute fracture          Assessment & Plan:  this is a 80 y.o man with CAD s/p stents, prostate CA s/p TURP, TIA, pacemaker placement, hyperlipidemia, hypothyroidism, who presents with fatigue and back pain. He is found to have a UTI.    UTI: (POA)  -IV ceftriaxone, IV fluids  -f/u urine culture  -reportedly saw his urologist 2 weeks ago and was voiding normally    Lethargy: (POA) likely mild metabolic encephalopathy due to UTI    Back pain: chronic, due to DJD, possibly worsened by UTI  -acetaminophen PRN, capsaicin cream  -avoid opiates     Hypothyroidism:   -continue levothyroxine    Hyperlipidemia:  -continue atorvastatin    CAD: s/p stenting, ASD repair, pacemaker placement  -continue ASA, sotalol, Eliquis    History of TIA    History of prostate CA s/p TURP    ACP was d/w the patient's wife. Treat the acute illness in hopes of recovery to baseline. She would like for him to be DNR and she wants to fill out a DDNR.     DVT ppx: Eliquis  Code status: DNR  Disposition: home health vs SNF    Signed By: Ning Hinojosa MD     November 30, 2017

## 2017-11-30 NOTE — PROGRESS NOTES
1545:  Patient found in recliner leaning over, gown off, condom cath removed, and trying to pull IV out of arm. Patient reoriented and redressed and educated to  remain seated and to call if they need assistance. Patient states \"I need to take this off\", indicating the IV and arm band. RN explained the importance of both. Patient became agitated \"You can't tell me what the h*ll I can do. \"  Patient moved closer to the RN station. 1630: Wife in room and helping to reorient patient.

## 2017-11-30 NOTE — ED NOTES
TRANSFER - OUT REPORT:    Verbal report given to JENLELE Fagan(name) on [facility]  being transferred to (unit) for routine progression of care       Report consisted of patients Situation, Background, Assessment and   Recommendations(SBAR). Information from the following report(s) SBAR, ED Summary, [facility] and Recent Results was reviewed with the receiving nurse. Lines:   Peripheral IV 11/30/17 Right Forearm (Active)   Site Assessment Clean, dry, & intact 11/30/2017  8:42 AM   Phlebitis Assessment 0 11/30/2017  8:42 AM   Infiltration Assessment 0 11/30/2017  8:42 AM   Dressing Status Clean, dry, & intact 11/30/2017  8:42 AM   Hub Color/Line Status Pink 11/30/2017  8:42 AM        Opportunity for questions and clarification was provided.

## 2017-11-30 NOTE — PROGRESS NOTES
Admission Medication Reconciliation:    Information obtained from: patient's wife     Significant PMH/Disease States:   Past Medical History:   Diagnosis Date    Artificial pacemaker     (atrial)  currently NSR    CAD (coronary artery disease)     ECG: old myocardial infarction 4/23/12    inferior Q's, NSR    Exercise habits 2012    runs [place] park daily    H/O echocardiogram 7/2011    EF= 50-55%, nl. fxn.  Hypercholesterolemia 1992    Hypothyroidism     Infarction of lymph node/vessel [id number]    WIFE UNSURE OF THIS STATES HAS STONE IN SALIVARY GLAND    Lumbar herniated disc     [address]an[address] 1991    chest    Nephrolithiasis 4/2010    Peripheral neuropathy 4/21/2010    Prostate cancer ([facility] [facility] 2005    TURP/seeds     Recurrent herpes simplex 4/21/2010    Seizure disorder (?) 4/21/2010    not active NOT HAD SEIZURE IN 50 YEARS PER WIFE    TIA (?) 7/2011    fainting spell >> pacemaker       Chief Complaint for this Admission:  back pain     Allergies:  Review of patient's allergies indicates no known allergies. Prior to Admission Medications:   Prior to Admission Medications   Prescriptions Last Dose Informant Patient Reported? Taking? apixaban (ELIQUIS) 5 mg tablet 11/30/2017 at am  Yes Yes   Sig: Take 5 mg by mouth every twelve (12) hours. aspirin delayed-release 81 mg tablet 11/30/2017 at am  Yes Yes   Sig: Take 81 mg by mouth daily. donepezil (ARICEPT) 10 mg tablet 11/30/2017 at am  Yes Yes   Sig: Take 10 mg by mouth daily (with breakfast). levothyroxine (SYNTHROID) 50 mcg tablet 11/30/2017 at am  Yes Yes   Sig: Take 50 mcg by mouth five (5) days a week. (100 mcg on Mon+Fri; 50 mcg x 5 days weekly)   levothyroxine (SYNTHROID) 50 mcg tablet 11/27/2017 at am  Yes Yes   Sig: Take 100 mcg by mouth every Monday and Friday. (100 mcg on Mon+Fri; 50 mcg x 5 days weekly)   memantine (NAMENDA) 10 mg tablet 11/30/2017 at am  Yes Yes   Sig: Take 10 mg by mouth two (2) times a day.    simvastatin (ZOCOR) 10 mg tablet 11/29/2017 at hs  Yes Yes   Sig: Take 10 mg by mouth nightly. sotalol (SOTALOL AF) 80 mg tablet 11/30/2017 at am  No Yes   Sig: Take 1 Tab by mouth two (2) times a day. tolterodine (DETROL) 2 mg tablet 11/29/2017 at hs  Yes Yes   Sig: Take 2 mg by mouth nightly. Indications: URINARY URGE INCONTINENCE      Facility-Administered Medications: None         Comments/Recommendations: This medication history was obtained from the patient's wife; (s)he appears to be a reliable historian and she brought his medication bottles to the hospital.  An Almena Loupe is not available. Inpatient orders were reviewed and no changes are needed. Medications added: none  Medications deleted: hydrocodone-APAP, ondansetron     Thank you for allowing me to participate in the care of this patient. Please contact the pharmacy () or the medication reconciliation pharmacy () with any questions. Millicent Hedrick, Pharm. D., BCPS, BCPPS

## 2017-11-30 NOTE — PROGRESS NOTES
[facility] services PT consult acknowledged and appreciated. Awaiting results of spinal CT prior to mobility. Thanks!     Maryann Dutta PT, DPT

## 2017-11-30 NOTE — IP AVS SNAPSHOT
[address] 
[phone] Patient: Regan Said MRN: [id number] QVC:[birth year] My Medications TAKE these medications as instructed Instructions Each Dose to Equal  
 Morning Noon Evening Bedtime  
 apixaban 5 mg tablet Commonly known as:  Eduard Palacios Your last dose was: Your next dose is: Take 5 mg by mouth every twelve (12) hours. 5 mg ARICEPT 10 mg tablet Generic drug:  donepezil Your last dose was: Your next dose is: Take 10 mg by mouth daily (with breakfast). 10 mg  
    
   
   
   
  
 aspirin delayed-release 81 mg tablet Your last dose was: Your next dose is: Take 81 mg by mouth daily. 81 mg  
    
   
   
   
  
 cefdinir 300 mg capsule Commonly known as:  OMNICEF Your last dose was: Your next dose is: Take 1 Cap by mouth two (2) times a day for 2 days. 300 mg DETROL 2 mg tablet Generic drug:  tolterodine Your last dose was: Your next dose is: Take 2 mg by mouth nightly. Indications: URINARY URGE INCONTINENCE  
 2 mg * levothyroxine 50 mcg tablet Commonly known as:  SYNTHROID Your last dose was: Your next dose is: Take 50 mcg by mouth five (5) days a week. (100 mcg on Mon+Fri; 50 mcg x 5 days weekly) 50 mcg * levothyroxine 50 mcg tablet Commonly known as:  SYNTHROID Your last dose was: Your next dose is: Take 100 mcg by mouth every Monday and Friday. (100 mcg on Mon+Fri; 50 mcg x 5 days weekly) 100 mcg  
    
   
   
   
  
 memantine 10 mg tablet Commonly known as:  Kiah Joe Your last dose was: Your next dose is: Take 10 mg by mouth two (2) times a day.   
 10 mg  
    
   
   
   
  
 simvastatin 10 mg tablet Commonly known as:  ZOCOR Your last dose was: Your next dose is: Take 10 mg by mouth nightly. 10 mg  
    
   
   
   
  
 sotalol 80 mg tablet Commonly known as:  SOTALOL AF Your last dose was: Your next dose is: Take 1 Tab by mouth two (2) times a day. 80 mg  
    
   
   
   
  
 * Notice: This list has 2 medication(s) that are the same as other medications prescribed for you. Read the directions carefully, and ask your doctor or other care provider to review them with you. Where to Get Your Medications Information on where to get these meds will be given to you by the nurse or doctor. ! Ask your nurse or doctor about these medications  
  cefdinir 300 mg capsule

## 2017-11-30 NOTE — ED PROVIDER NOTES
HPI Comments: 80 y.o. male with past medical history significant for CAD, TIA, old myocardial infarction, peripheral neuropathy who presents from home with chief complaint of back pain. Pt c/o back pain that's been worsening for the past few days causing him difficulty walking and getting around the house. Pt's wife reports he seems weaker than usual when trying to stand up and move with his rollator to move around the house, and has had trouble when home alone while she's at work. Pt doesn't have any home health care. Pt was given tylenol 2 hours ago for the pain. Pt saw his urologist 10 days ago and had clear urine at that time. Pt also reports that his R leg has been giving him problems while walking. Pt denies any falls or injury. Pt denies any fever, HA, vomiting, CP, or abd pain. Pt denies any hx of back surgery. Pt's wife claims his appetite has decreased and he didn't eat breakfast this morning. There are no other acute medical concerns at this time. PCP: Candy Talbot MD    Note written by Shannan Montemayor, as dictated by Kanchan Waller MD 7:52 AM      The history is provided by the patient and the spouse. No  was used. Past Medical History:   Diagnosis Date    Artificial pacemaker     (atrial)  currently NSR    CAD (coronary artery disease)     ECG: old myocardial infarction 4/23/12    inferior Q's, NSR    Exercise habits 2012    runs [place] park daily    H/O echocardiogram 7/2011    EF= 50-55%, nl. fxn.     Hypercholesterolemia 1992    Hypothyroidism     Infarction of lymph node/vessel [id number]    WIFE UNSURE OF THIS STATES HAS STONE IN SALIVARY GLAND    Lumbar herniated disc     Melanoma ([address] 1991    chest    Nephrolithiasis 4/2010    Peripheral neuropathy 4/21/2010    Prostate cancer ([address] 2005    TURP/seeds     Recurrent herpes simplex 4/21/2010    Seizure disorder (?) 4/21/2010    not active NOT HAD SEIZURE IN 50 YEARS PER WIFE    TIA (?) 7/2011 fainting spell >> pacemaker       Past Surgical History:   Procedure Laterality Date    CARDIAC SURG PROCEDURE UNLIST      HOLE IN HEART FIXED ASD    ENDOSCOPY, COLON, DIAGNOSTIC  9/05    HX CATARACT REMOVAL      LUCIA.  HX HEART CATHETERIZATION      3 stents    HX PACEMAKER  2011    sss ppm st judes    HX PROSTATECTOMY  3/05    green light TURP/RADIOACTIVE SEEDS    HX TONSILLECTOMY      HX UROLOGICAL      urinary stricture    NEUROLOGICAL PROCEDURE UNLISTED  age 12    skull fracture         Family History:   Problem Relation Age of Onset    Heart Disease Mother      MI   [place] [place] Hypertension Father     Anesth Problems Neg Hx        Social History     Social History    Marital status:      Spouse name: N/A    Number of children: N/A    Years of education: N/A     Occupational History    Not on file. Social History Main Topics    Smoking status: Never Smoker    Smokeless tobacco: Never Used    Alcohol use No    Drug use: Not on file    Sexual activity: Not on file     Other Topics Concern    Not on file     Social History Narrative         ALLERGIES: Review of patient's allergies indicates no known allergies. Review of Systems   Constitutional: Positive for appetite change. Negative for chills and fever. HENT: Negative for sore throat. Eyes: Negative for pain. Respiratory: Negative for cough and shortness of breath. Cardiovascular: Negative for chest pain. Gastrointestinal: Negative for abdominal pain, nausea and vomiting. Genitourinary: Negative for dysuria. Musculoskeletal: Positive for back pain. Skin: Negative for rash. Neurological: Positive for weakness. Negative for syncope and headaches. Psychiatric/Behavioral: Negative for confusion. All other systems reviewed and are negative.       Vitals:    11/30/17 0732   BP: 150/88   Pulse: 76   Resp: 20   Temp: 97.9 °F (36.6 °C)   SpO2: 96%   Weight: 73 kg (161 lb)   Height: 5' 8\" (1.727 m)            Physical Exam Constitutional: He is oriented to person, place, and time. He appears well-developed. No distress. HENT:   Head: Normocephalic and atraumatic. Deaf in R ear   Eyes: Conjunctivae and EOM are normal.   Neck: Normal range of motion. Neck supple. Cardiovascular: Normal rate, regular rhythm and normal heart sounds. No murmur heard. Pulmonary/Chest: Effort normal and breath sounds normal. No respiratory distress. Abdominal: Soft. Bowel sounds are normal. He exhibits no distension. There is no tenderness. There is no rebound. Musculoskeletal: Normal range of motion. He exhibits tenderness (diffuse lumbar ttp, no step-offs). He exhibits no edema. Neurological: He is alert and oriented to person, place, and time. No cranial nerve deficit. He exhibits normal muscle tone. Coordination normal.   Skin: Skin is warm and dry. Nursing note and vitals reviewed. Note written by Shannan Tapia, as dictated by Lopez Crouch MD 8:16 AM      [facility]  ED Course       Procedures    ED EKG interpretation:  Rhythm: atrial paced rhythm; and regular . Rate (approx.): 70; prolonged AV conduction; no STEMI; inferior Q waves age undetermined. Note written by Shannan Tapia, as dictated by Lopez Crouch MD 8:45 AM    CONSULT NOTE:  10:48 AM Lopez Crouch MD spoke with Dr. Chanelle Brewer, Consult for Hospitalist.  Discussed available diagnostic tests and clinical findings. He is in agreement with care plans as outlined. Dr. Chanelle Brewer will admit the patient.

## 2017-11-30 NOTE — ACP (ADVANCE CARE PLANNING)
Request by staff, through spiritual order, to assist with advance medical directive. Consulted with patients chart. After reviewing pt's chart, and discussing with bedside RN, pt is not currently able to complete an AMD at this time. Pt's spouse is present and has been serving as agent and medical decision maker. Chaplains will continue to follow as able and as needed for support.      [id number] Ladonna Adan M.Div, M.S, Jaye 605 available at [phone]

## 2017-11-30 NOTE — PROGRESS NOTES
Spiritual Care Assessment/Progress Notes    Garry Yusuf [id number]  [id number]    1/20/1927  80 y.o.  male    Patient Telephone Number: [phone] (home)   Rastafarian Affiliation: Jamal Elizabeth   Language: English   Extended Emergency Contact Information  Primary Emergency Contact: Anton Jiang  Address: [address] [place], [place] [address]  Work Phone: [phone]  Mobile Phone: [phone]  Relation: Spouse   Patient Active Problem List    Diagnosis Date Noted    UTI (urinary tract infection) 11/30/2017    Altered mental status 05/04/2015    Hemorrhage of right kidney 05/04/2015    Vertigo 05/27/2013    Melanoma in situ of back ([facility] [address] 03/04/2013    Melanoma of shoulder ([facility] [address] 03/04/2013    Sialoadenitis 05/02/2012    Exercise habits     Other ill-defined conditions(799.89)     Peripheral neuropathy 04/21/2010    Recurrent herpes simplex 04/21/2010    Prostate CA ([facility] [address] 04/21/2010    Personal history of kidney stones 04/21/2010    History of melanoma 04/21/2010    Hypercholesteremia 08/21/1992        Date: 11/30/2017       Level of Rastafarian/Spiritual Activity:  []         Involved in bhavesh tradition/spiritual practice    []         Not involved in bhavesh tradition/spiritual practice  [x]         Spiritually oriented    []         Claims no spiritual orientation    []         seeking spiritual identity  []         Feels alienated from Yarsani practice/tradition  []         Feels angry about Yarsani practice/tradition  []         Spirituality/Yarsani tradition is a resource for coping at this time.   []         Not able to assess due to medical condition    Services Provided Today:  []         crisis intervention    []         reading Scriptures  []         spiritual assessment    []         prayer  [x]         empathic listening/emotional support  []         rites and rituals (cite in comments)  []         life review     []         Yarsani support  []         theological development   []         advocacy  []         ethical dialog     []         blessing  []         bereavement support    []         support to family  []         anticipatory grief support   [x]         help with AMD  []         spiritual guidance    []         meditation      Spiritual Care Needs  [x]         Emotional Support  []         Spiritual/Taoist Care  []         Loss/Adjustment  []         Advocacy/Referral                /Ethics  []         No needs expressed at               this time  []         Other: (note in               comments)  [address]  []         Follow up visits with               pt/family  []         Provide materials  []         Schedule sacraments  []         Contact Community               Clergy  [x]         Follow up as needed  []         Other: (note in               comments)     Comments: Request by staff, through spiritual order, to assist with advance medical directive. Consulted with patients chart. After reviewing pt's chart, and discussing with bedside RN, pt is not currently able to complete an AMD at this time. Pt's spouse is present and has been serving as agent and medical decision maker. Chaplains will continue to follow as able and as needed for support. Additionally,  visited with pt and wife offering a supportive presence. No immediate spiritual needs for support. Spiritual care will continue to follow as able and as needed.      [address] Ladonna Adan M.Div, M.S, Jaye 606 available at [phone]

## 2017-11-30 NOTE — IP AVS SNAPSHOT
[address] 
[phone] Patient: Brendan Hammer MRN: [id number] NMB:4/73/2526 About your hospitalization You were admitted on:  November 30, 2017 You last received care in the:  [facility] [phone] You were discharged on:  December 4, 2017 Why you were hospitalized Your primary diagnosis was:  Uti (Urinary Tract Infection) Things You Need To Do (next 8 weeks) Follow up with Evelyn Adan MD  
follow up post hospitalization Phone:  [phone] Where:  [facility], [address], [place] [address] Follow up with [facility] For rehab Phone:  [phone] Where:  [facility] [address], [place] [address] Discharge Orders None A check sagrario indicates which time of day the medication should be taken. My Medications TAKE these medications as instructed Instructions Each Dose to Equal  
 Morning Noon Evening Bedtime  
 apixaban 5 mg tablet Commonly known as:  Nathanael Cordoba Your last dose was: Your next dose is: Take 5 mg by mouth every twelve (12) hours. 5 mg ARICEPT 10 mg tablet Generic drug:  donepezil Your last dose was: Your next dose is: Take 10 mg by mouth daily (with breakfast). 10 mg  
    
   
   
   
  
 aspirin delayed-release 81 mg tablet Your last dose was: Your next dose is: Take 81 mg by mouth daily. 81 mg  
    
   
   
   
  
 cefdinir 300 mg capsule Commonly known as:  OMNICEF Your last dose was: Your next dose is: Take 1 Cap by mouth two (2) times a day for 2 days. 300 mg DETROL 2 mg tablet Generic drug:  tolterodine Your last dose was: Your next dose is: Take 2 mg by mouth nightly.  Indications: URINARY URGE INCONTINENCE  
 2 mg  
    
   
   
   
  
 * levothyroxine 50 mcg tablet Commonly known as:  SYNTHROID Your last dose was: Your next dose is: Take 50 mcg by mouth five (5) days a week. (100 mcg on Mon+Fri; 50 mcg x 5 days weekly) 50 mcg * levothyroxine 50 mcg tablet Commonly known as:  SYNTHROID Your last dose was: Your next dose is: Take 100 mcg by mouth every Monday and Friday. (100 mcg on Mon+Fri; 50 mcg x 5 days weekly) 100 mcg  
    
   
   
   
  
 memantine 10 mg tablet Commonly known as:  Rosalva Saner Your last dose was: Your next dose is: Take 10 mg by mouth two (2) times a day. 10 mg  
    
   
   
   
  
 simvastatin 10 mg tablet Commonly known as:  ZOCOR Your last dose was: Your next dose is: Take 10 mg by mouth nightly. 10 mg  
    
   
   
   
  
 sotalol 80 mg tablet Commonly known as:  SOTALOL AF Your last dose was: Your next dose is: Take 1 Tab by mouth two (2) times a day. 80 mg  
    
   
   
   
  
 * Notice: This list has 2 medication(s) that are the same as other medications prescribed for you. Read the directions carefully, and ask your doctor or other care provider to review them with you. Where to Get Your Medications Information on where to get these meds will be given to you by the nurse or doctor. ! Ask your nurse or doctor about these medications  
  cefdinir 300 mg capsule Discharge Instructions Discharge Summary  
  
  
PATIENT ID: Giovanni Jiang MRN: [id number] YOB: 1927 DATE OF ADMISSION: 11/30/2017  7:27 AM   
DATE OF DISCHARGE: 12/4/2017 PRIMARY CARE PROVIDER: Alicia Licea MD  
  
ATTENDING PHYSICIAN: Dr. Kristin Menchaca DISCHARGING PROVIDER: Ca Edgar NP To contact this individual call [phone] and ask the  to page. If unavailable ask to be transferred the Adult Hospitalist Department. 
  
CONSULTATIONS: ED CONSULT TO SENIOR SERVICES CASE MANAGEMENT 
ED CONSULT TO Laz Manning 
ED CONSULT TO SENIOR SERVICES PHARMACIST 
IP CONSULT TO HOSPITALIST 
  
PROCEDURES/SURGERIES: * No surgery found * 
  
ADMITTING [facility] COURSE:  
Pt presented to the ED with fatigue and back pain. On initial assessment he was lethargic and didnt provide much history. Per his wife, he has chronic low back pain, but for the past 3 days he has been complaining of it more. She also noted that he had become increasingly fatigued and lethargic, and not eating as much. He is ambulatory with a walker at baseline but has been in a chair for most of the past 2 days. No documented fever, no known dysuria or hematuria. 
  
12/4/2017 VSS, pt offers no complaints, very [place]. Wife at bedside this am.  Rudy De La Cruz completed with wife. He has received 5 days of ceftriaxone, should complete 2 more days of abx w/ omnicef. Pt has baseline memory impairment/confusion at baseline per wife.  
  
DISCHARGE DIAGNOSES / PLAN:   
  
UTI (POA): 
- ceftriaxone x 5 days, finish with 2 more days of omnicef - e coli, pan-sensitive 
- reportedly saw his urologist 2 weeks ago and was voiding normally 
   
Lethargy and Acute Encephalopthy (POA): improved 
- likely mild metabolic encephalopathy due to UTI  
- now answers most of the questions. Wife reports he has troubles with current events and time at home. Some confusion 
   
Back pain: not significant factor this am 
- chronic, due to DJD, possibly worsened by UTI 
- acetaminophen PRN, capsaicin cream 
- avoid opiates  
   
Hypothyroidism: continue levothyroxine 
   
Hyperlipidemia: continue atorvastatin 
   
Hx CAD: 
- s/p stenting, ASD repair, pacemaker placement, chronic atrial fib. - On  ASA, sotalol, Eliquis.   
- Consider stopping 1 blood thinner in an elderly patient (Probably just keep on Eliquis if ok with Cardiology). Can discuss this at next cardiology appt. 
   
History of TIA: on asa and statin 
   
History of prostate CA: s/p TURP 
   
Code status: DDNR 
DVT prophylaxis: Eliquis Care Plan discussed with: patient, attending MD 
Disposition: acceptance to [address] [place] [address]  
  
  
PENDING TEST RESULTS:  
At the time of discharge the following test results are still pending: none 
  
FOLLOW UP APPOINTMENTS:   
Follow-up Information Follow up With Details Comments Contact Info  
  Troy Dumas MD   follow up post hospitalization [facility] [address] [facility] 
[phone]  
  
  
  
ADDITIONAL CARE RECOMMENDATIONS: as above 
  
DIET:  cardiac 
  
ACTIVITY: per rehab 
  
WOUND CARE: none 
  
EQUIPMENT needed: none 
  
  
DISCHARGE MEDICATIONS: 
   
Current Discharge Medication List  
   
   
START taking these medications  
  Details  
cefdinir (OMNICEF) 300 mg capsule Take 1 Cap by mouth two (2) times a day for 2 days. Qty: 4 Cap, Refills: 0  
   
   
   
CONTINUE these medications which have NOT CHANGED  
  Details  
aspirin delayed-release 81 mg tablet Take 81 mg by mouth daily.  
   
!! levothyroxine (SYNTHROID) 50 mcg tablet Take 50 mcg by mouth five (5) days a week. (100 mcg on Mon+Fri; 50 mcg x 5 days weekly)  
   
!! levothyroxine (SYNTHROID) 50 mcg tablet Take 100 mcg by mouth every Monday and Friday. (100 mcg on Mon+Fri; 50 mcg x 5 days weekly)  
   
simvastatin (ZOCOR) 10 mg tablet Take 10 mg by mouth nightly.  
   
memantine (NAMENDA) 10 mg tablet Take 10 mg by mouth two (2) times a day.  
   
sotalol (SOTALOL AF) 80 mg tablet Take 1 Tab by mouth two (2) times a day. Qty: 60 Tab, Refills: 6  
   
apixaban (ELIQUIS) 5 mg tablet Take 5 mg by mouth every twelve (12) hours.  
   
donepezil (ARICEPT) 10 mg tablet Take 10 mg by mouth daily (with breakfast).    
tolterodine (DETROL) 2 mg tablet Take 2 mg by mouth nightly.  Indications: URINARY URGE INCONTINENCE  
   
 !! - Potential duplicate medications found. Please discuss with provider.  
   
  
  
  
NOTIFY YOUR PHYSICIAN FOR ANY OF THE FOLLOWING:  
Fever over 101 degrees for 24 hours. Chest pain, shortness of breath, fever, chills, nausea, vomiting, diarrhea, change in mentation, falling, weakness, bleeding. Severe pain or pain not relieved by medications. Or, any other signs or symptoms that you may have questions about. 
  
DISPOSITION: 
   Home With: 
  OT   PT   HH   RN  
  
xx Long term SNF/Inpatient Rehab  
  Independent/assisted living  
  Hospice  
  Other:  
  
  
PATIENT CONDITION AT DISCHARGE:  
  
Functional status  
  Poor   
xx Deconditioned   
  Independent   
  
Cognition  
   Lucid   
xx Forgetful   
xx Dementia   
  
Catheters/lines (plus indication)  
  Skinner   
  PICC   
  PEG   
xx None   
  
Code status  
   Full code   
xx DNR   
  
PHYSICAL EXAMINATION AT DISCHARGE: 
Constitutional:  No acute distress, cooperative, pleasant   
ENT:  Oral mucous membranes moist. 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 Very [place], left hearing aide Resp:  CTA bilaterally. No wheezing. No accessory muscle use, on RA  
CV:  Regular rhythm, normal rate, no murmurs  
 GI:  Soft, non distended, non tender. normoactive bowel sounds +BM  
 Musculoskeletal:  No edema, warm, 2+ pulses throughout  
 Neurologic:  Moves all extremities.  AAOx2  
                     Psych:  Poor insight, Not anxious nor agitated. 
    
  
  
CHRONIC MEDICAL DIAGNOSES: 
Problem List as of 12/4/2017  Date Reviewed: 12/4/2017  
          Codes Class Noted - Resolved  
  * (Principal)UTI (urinary tract infection) ICD-10-CM: N39.0 ICD-9-CM: 599.0   11/30/2017 - Present  
     
  Altered mental status ICD-10-CM: R41.82 
ICD-9-CM: 780.97   5/4/2015 - Present  
     
  Hemorrhage of right kidney ICD-10-CM: N28.89 ICD-9-CM: 593.81   5/4/2015 - Present  
     
  Vertigo ICD-10-CM: V51 
 ICD-9-CM: 780. 4   5/27/2013 - Present  
     
  Melanoma in situ of back ([facility] ICD-10-CM: D03.59 
ICD-9-CM: 172. 5   3/4/2013 - Present  
     
  Melanoma of shoulder ([facility] ICD-10-CM: C43.60 ICD-9-CM: 172. 6   3/4/2013 - Present  
     
  Sialoadenitis ICD-10-CM: K11.20 ICD-9-CM: 527.2   5/2/2012 - Present  
     
  Exercise habits ICD-9-CM: TMX5556   Unknown - Present  
  Overview Signed 5/2/2012  8:31 AM by Donato Goel, DO  
    runs in park daily    
     
  Other ill-defined conditions(799.89) ICD-10-CM: R69 
ICD-9-CM: 799.89   Unknown - Present  
  Overview Signed 5/1/2012  1:44 PM by Donato Goel, DO  
    -WIFE WAS CALLED TO CONFIRM HISTORY  
   
     
  Peripheral neuropathy ICD-10-CM: G62.9 ICD-9-CM: 644. 9   4/21/2010 - Present  
     
  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 9   4/21/2010 - Present  
  Overview Signed 4/21/2010  4:38 PM by Kaela Asencio  
    Chest/scalp  
   
     
  Prostate CA [facility]) ICD-10-CM: C35 ICD-9-CM: 185   4/21/2010 - Present  
  Overview Signed 4/21/2010  4:38 PM by Estle [place]  
    radiation  
   
     
  Personal history of kidney stones ICD-10-CM: Z87.442 ICD-9-CM: V13.01   4/21/2010 - Present  
     
  History of melanoma ICD-10-CM: Z85.820 ICD-9-CM: V10.82   4/21/2010 - Present  
     
  Hypercholesteremia ICD-10-CM: E78.00 ICD-9-CM: 272.0   8/21/1992 - Present  
  Overview Signed 4/21/2010  4:39 PM by Kaela Asencio  
    mild  
   
     
  RESOLVED: Hypoxia ICD-10-CM: R09.02 
ICD-9-CM: 799.02   5/3/2012 - 5/5/2012  
     
  RESOLVED: Right leg weakness ICD-10-CM: R29.898 ICD-9-CM: 729.89   5/3/2012 - 5/5/2012  
     
  RESOLVED: Seizure disorder ([facility] ICD-10-CM: G40.909 ICD-9-CM: 345.90   4/21/2010 - 1/19/2011  
  Overview Signed 4/21/2010  4:39 PM by Kaela Asencio  
    2 degree to head injury in 1943  
   
     
  RESOLVED: Infarction of lymph node/vessel ICD-10-CM: I89.8 ICD-9-CM: 425. 8   4/21/2010 - 1/19/2011   Overview Signed 4/21/2010  4:41 PM by Karyn Jackman  
    Small vessel infarct R mid-brain  
   
     
  
  
  
Greater than 30 minutes were spent with the patient on counseling and coordination of care 
  
Signed:  
Vineet Mcintosh NP 
12/4/2017 
11:05 AM 
   
   
  
     
   
 
 
 
  
  
  
[employer] Announcement We are excited to announce that we are making your provider's discharge notes available to you in [employer]. You will see these notes when they are completed and signed by the physician that discharged you from your recent hospital stay. If you have any questions or concerns about any information you see in [employer], please call the Health Information Department where you were seen or reach out to your Primary Care Provider for more information about your plan of care. Introducing hospitals & HEALTH SERVICES! [employer] introduces [employer] patient portal. Now you can access parts of your medical record, email your doctor's office, and request medication refills online. 1. In your internet browser, go to [email or web address] 2. Click on the First Time User? Click Here link in the Sign In box. You will see the New Member Sign Up page. 3. Enter your [employer] Access Code exactly as it appears below. You will not need to use this code after youve completed the sign-up process. If you do not sign up before the expiration date, you must request a new code. · [employer] Access Code: [email or web address] Expires: 3/4/2018 11:26 AM 
 
4. Enter the last four digits of your Social Security Number (xxxx) and Date of Birth (mm/dd/yyyy) as indicated and click Submit. You will be taken to the next sign-up page. 5. Create a [employer] ID. This will be your [employer] login ID and cannot be changed, so think of one that is secure and easy to remember. 6. Create a [employer] password. You can change your password at any time. 7. Enter your Password Reset Question and Answer.  This can be used at a later time if you forget your password. 8. Enter your e-mail address. You will receive e-mail notification when new information is available in [address] 9. Click Sign Up. You can now view and download portions of your medical record. 10. Click the Download Summary menu link to download a portable copy of your medical information. If you have questions, please visit the Frequently Asked Questions section of the [employer] website. Remember, [employer] is NOT to be used for urgent needs. For medical emergencies, dial 911. Now available from your iPhone and Android! Providers Seen During Your Hospitalization Provider Specialty Primary office phone Eze Horn MD Emergency Medicine [phone] Agusto Trujillo MD Hospitalist [phone] Jamee Duran MD Internal Medicine [phone] Faye Coats MD Internal Medicine [phone] Your Primary Care Physician (PCP) Primary Care Physician Office Phone Office Fax [address] [phone] You are allergic to the following No active allergies Recent Documentation Height Weight BMI Smoking Status 1.727 m 73 kg 24.48 kg/m2 Never Smoker Emergency Contacts Name Discharge Info Relation Home Work Mobile [address] CAREGIVER [3] Spouse [3]  [phone] Patient Belongings The following personal items are in your possession at time of discharge: 
  Dental Appliances: None  Visual Aid: None      Home Medications: None   Jewelry: None  Clothing: At bedside    Other Valuables: None  Personal Items Sent to Safe: none Please provide this summary of care documentation to your next provider. Signatures-by signing, you are acknowledging that this After Visit Summary has been reviewed with you and you have received a copy. Patient Signature:  ____________________________________________________________ Date:  ____________________________________________________________  
  
Casandra Azeem Provider Signature:  ____________________________________________________________ Date:  ____________________________________________________________

## 2017-12-01 ENCOUNTER — APPOINTMENT (OUTPATIENT)
Dept: GENERAL RADIOLOGY | Age: 82
DRG: 689 | End: 2017-12-01
Attending: HOSPITALIST
Payer: MEDICARE

## 2017-12-01 LAB
ANION GAP SERPL CALC-SCNC: 5 MMOL/L (ref 5–15)
BUN SERPL-MCNC: 14 MG/DL (ref 6–20)
BUN/CREAT SERPL: 16 (ref 12–20)
CALCIUM SERPL-MCNC: 8.3 MG/DL (ref 8.5–10.1)
CHLORIDE SERPL-SCNC: 105 MMOL/L (ref 97–108)
CO2 SERPL-SCNC: 29 MMOL/L (ref 21–32)
CREAT SERPL-MCNC: 0.85 MG/DL (ref 0.7–1.3)
ERYTHROCYTE [DISTWIDTH] IN BLOOD BY AUTOMATED COUNT: 14.4 % (ref 11.5–14.5)
GLUCOSE SERPL-MCNC: 97 MG/DL (ref 65–100)
HCT VFR BLD AUTO: 40.5 % (ref 36.6–50.3)
HGB BLD-MCNC: 13.2 G/DL (ref 12.1–17)
MAGNESIUM SERPL-MCNC: 2.1 MG/DL (ref 1.6–2.4)
MCH RBC QN AUTO: 29.9 PG (ref 26–34)
MCHC RBC AUTO-ENTMCNC: 32.6 G/DL (ref 30–36.5)
MCV RBC AUTO: 91.8 FL (ref 80–99)
PHOSPHATE SERPL-MCNC: 2.7 MG/DL (ref 2.6–4.7)
PLATELET # BLD AUTO: 115 K/UL (ref 150–400)
POTASSIUM SERPL-SCNC: 4.3 MMOL/L (ref 3.5–5.1)
RBC # BLD AUTO: 4.41 M/UL (ref 4.1–5.7)
SODIUM SERPL-SCNC: 139 MMOL/L (ref 136–145)
WBC # BLD AUTO: 12.7 K/UL (ref 4.1–11.1)

## 2017-12-01 PROCEDURE — 74011000258 HC RX REV CODE- 258: Performed by: HOSPITALIST

## 2017-12-01 PROCEDURE — 97116 GAIT TRAINING THERAPY: CPT

## 2017-12-01 PROCEDURE — 83735 ASSAY OF MAGNESIUM: CPT | Performed by: HOSPITALIST

## 2017-12-01 PROCEDURE — 85027 COMPLETE CBC AUTOMATED: CPT | Performed by: HOSPITALIST

## 2017-12-01 PROCEDURE — 74011250636 HC RX REV CODE- 250/636: Performed by: HOSPITALIST

## 2017-12-01 PROCEDURE — 74011250637 HC RX REV CODE- 250/637: Performed by: HOSPITALIST

## 2017-12-01 PROCEDURE — 84100 ASSAY OF PHOSPHORUS: CPT | Performed by: HOSPITALIST

## 2017-12-01 PROCEDURE — 97530 THERAPEUTIC ACTIVITIES: CPT

## 2017-12-01 PROCEDURE — 97535 SELF CARE MNGMENT TRAINING: CPT

## 2017-12-01 PROCEDURE — 65270000032 HC RM SEMIPRIVATE

## 2017-12-01 PROCEDURE — 36415 COLL VENOUS BLD VENIPUNCTURE: CPT | Performed by: HOSPITALIST

## 2017-12-01 PROCEDURE — 97165 OT EVAL LOW COMPLEX 30 MIN: CPT

## 2017-12-01 PROCEDURE — G8987 SELF CARE CURRENT STATUS: HCPCS

## 2017-12-01 PROCEDURE — 71020 XR CHEST PA LAT: CPT

## 2017-12-01 PROCEDURE — 80048 BASIC METABOLIC PNL TOTAL CA: CPT | Performed by: HOSPITALIST

## 2017-12-01 PROCEDURE — G8988 SELF CARE GOAL STATUS: HCPCS

## 2017-12-01 RX ORDER — DONEPEZIL HYDROCHLORIDE 10 MG/1
10 TABLET, FILM COATED ORAL
Status: DISCONTINUED | OUTPATIENT
Start: 2017-12-01 | End: 2017-12-04 | Stop reason: HOSPADM

## 2017-12-01 RX ADMIN — Medication 10 ML: at 16:13

## 2017-12-01 RX ADMIN — CEFTRIAXONE 1 G: 1 INJECTION, POWDER, FOR SOLUTION INTRAMUSCULAR; INTRAVENOUS at 09:58

## 2017-12-01 RX ADMIN — Medication 10 ML: at 08:47

## 2017-12-01 RX ADMIN — CAPSAICIN: 0.25 CREAM TOPICAL at 10:05

## 2017-12-01 RX ADMIN — SOTALOL HYDROCHLORIDE 80 MG: 80 TABLET ORAL at 09:59

## 2017-12-01 RX ADMIN — Medication 10 ML: at 21:33

## 2017-12-01 RX ADMIN — CAPSAICIN: 0.25 CREAM TOPICAL at 21:33

## 2017-12-01 RX ADMIN — LEVOTHYROXINE SODIUM 100 MCG: 50 TABLET ORAL at 08:48

## 2017-12-01 RX ADMIN — SOTALOL HYDROCHLORIDE 80 MG: 80 TABLET ORAL at 18:34

## 2017-12-01 RX ADMIN — APIXABAN 5 MG: 5 TABLET, FILM COATED ORAL at 21:33

## 2017-12-01 RX ADMIN — SODIUM CHLORIDE 75 ML/HR: 900 INJECTION, SOLUTION INTRAVENOUS at 01:55

## 2017-12-01 RX ADMIN — ASPIRIN 81 MG: 81 TABLET, COATED ORAL at 09:58

## 2017-12-01 RX ADMIN — DONEPEZIL HYDROCHLORIDE 10 MG: 10 TABLET, FILM COATED ORAL at 09:59

## 2017-12-01 RX ADMIN — APIXABAN 5 MG: 5 TABLET, FILM COATED ORAL at 09:59

## 2017-12-01 RX ADMIN — CAPSAICIN: 0.25 CREAM TOPICAL at 16:13

## 2017-12-01 RX ADMIN — MEMANTINE 10 MG: 10 TABLET ORAL at 18:34

## 2017-12-01 RX ADMIN — SIMVASTATIN 10 MG: 10 TABLET, FILM COATED ORAL at 21:33

## 2017-12-01 RX ADMIN — MEMANTINE 10 MG: 10 TABLET ORAL at 09:59

## 2017-12-01 RX ADMIN — OXYBUTYNIN CHLORIDE 5 MG: 5 TABLET, EXTENDED RELEASE ORAL at 21:33

## 2017-12-01 NOTE — PROGRESS NOTES
Bedside shift change report given to Ash RN (oncoming nurse) by Faiza Mcbride RN (offgoing nurse). Report included the following information SBAR, Intake/Output and MAR.

## 2017-12-01 NOTE — PROGRESS NOTES
Chart reviewed. CM noted that patient is confused. CM attempted to contact wife Mrs. Jiang ([phone]) to complete assessment. CM left voicemail message. Called other phone number in chart, which was the number for [employer] where the wife works.  at [employer] [facility] [employer] stated that Mrs. Jiang is not working today. CM awaiting for response form wife. Therapy is recommending SNF. CM noted per therapy assessment that patient is home alone during the day while wife works. Care management will continue to follow. 4:30 PM: CM met with patient and wife at bedside. Patient lives with wife in a condo in [place]. Wife works during the day and patient is by himself during the day. Patient ambulated with a walker PTA. Patient mostly independent with ADLs, wife is present when patient is bathing to ensure he does not fall. Patient has been to SNF before at [address] [place] [address] and prefers to go there at discharge. CM sent referral to [facility] via [employer]. Wife stated that patient will need transportation arranged at discharge. Care management will continue to follow for discharge planning. Care Management Interventions  PCP Verified by CM: Yes Marcela Herrera MD)  Mode of Transport at Discharge:  Other (see comment) (will need transportation at discharge)  Transition of Care Consult (CM Consult): Discharge Planning  MyChart Signup: No  Discharge Durable Medical Equipment: No  Physical Therapy Consult: Yes  Occupational Therapy Consult: Yes  Speech Therapy Consult: No  Current Support Network: Lives with Spouse, Own Home  Confirm Follow Up Transport: Other (see comment) (Will need transportation)  Plan discussed with Pt/Family/Caregiver: Yes  Freedom of Choice Offered: Yes  Discharge Location  Discharge Placement: Skilled nursing facility    ATA Robert/GILMER

## 2017-12-01 NOTE — PROGRESS NOTES
Problem: Self Care Deficits Care Plan (Adult)  Goal: *Acute Goals and Plan of Care (Insert Text)  Occupational Therapy Goals  Initiated 12/1/2017  1. Patient will perform grooming in standing with modified independence within 7 day(s). 2.  Patient will perform bathing with minimal assistance/contact guard assist within 7 day(s). 3.  Patient will perform upper body dressing and lower body dressing with supervision/set-up within 7 day(s). 4.  Patient will perform toilet transfers with minimal assistance/contact guard assist within 7 day(s). 5.  Patient will perform all aspects of toileting with supervision/set-up within 7 day(s). 6.  Patient will participate in upper extremity therapeutic exercise/activities with supervision/set-up for 5 minutes within 7 day(s). 7.  Patient will utilize energy conservation techniques during functional activities with verbal, visual and tactile cues within 7 day(s). 8. Patient will participate in [facility] screening in 7 days to facilitate safe discharge planning as he is home alone while wife works and she wishes to take him home. Occupational Therapy EVALUATION  Patient: Ricky Cotter (63 y.o. male)  Date: 12/1/2017  Primary Diagnosis: UTI (urinary tract infection)        Precautions:   DNR, Fall, Skin ([place]; Deaf R; sleeps in recliner PTA)    ASSESSMENT :  Based on the objective data described below, the patient presents with general debility, impaired cognition, decreased functional standing tolerance and balance and mod-max A ADLs. Admitted to ER with UTI. PTA stays alone during day while wife works, but only uses recliner, no bed due to inability to get in/out, and only wears shirt/boxer shorts to make it more easy for him to toilet himself while she is gone. She reports he is technically able to dress LEs but she does it to save time, PTA. Small suffeling unsafe steps this session with  poor safety using RW. Expect patient will need rehab prior to discharge to home. expect patient was functioning very marginally in the home while wife worked; expect it may possibly be time for them to make other arrangements as do not feel patient will be safe alone in the home in the near future due to decreased cognition      Patient will benefit from skilled intervention to address the above impairments. Patients rehabilitation potential is considered to be Fair  Factors which may influence rehabilitation potential include:   []             None noted  [x]             Mental ability/status  [x]             Medical condition  [x]             Home/family situation and support systems (wife works all day daily)  [x]             Safety awareness  []             Pain tolerance/management  []             Other:      PLAN :  Recommendations and Planned Interventions:  [x]               Self Care Training                  [x]        Therapeutic Activities  [x]               Functional Mobility Training    [x]        Cognitive Retraining  [x]               Therapeutic Exercises           [x]        Endurance Activities  [x]               Balance Training                   []        Neuromuscular Re-Education  []               Visual/Perceptual Training     [x]   Home Safety Training  [x]               Patient Education                 [x]        Family Training/Education  []               Other (comment):    Frequency/Duration: Patient will be followed by occupational therapy 5 times a week to address goals. Discharge Recommendations: Inpatient Rehab, [place] and To Be Determined  Further Equipment Recommendations for Discharge: TBA     SUBJECTIVE:   Patient stated I know I'm a pain in the neck; I'm sorry.     OBJECTIVE DATA SUMMARY:   HISTORY:   Past Medical History:   Diagnosis Date    Artificial pacemaker     (atrial)  currently NSR    CAD (coronary artery disease)     ECG: old myocardial infarction 4/23/12    inferior Q's, NSR    Exercise habits 2012    [place]  H/O echocardiogram 7/2011    EF= 50-55%, nl. fxn.  Hypercholesterolemia 1992    Hypothyroidism     Infarction of lymph node/vessel [id number]    WIFE UNSURE OF THIS STATES HAS STONE IN SALIVARY GLAND    Lumbar herniated disc     Melanoma ([facility] [address] 1991    chest    Nephrolithiasis 4/2010    Peripheral neuropathy 4/21/2010    Prostate cancer ([facility] [address] 2005    TURP/seeds     Recurrent herpes simplex 4/21/2010    Seizure disorder (?) 4/21/2010    not active NOT HAD SEIZURE IN 50 YEARS PER WIFE    TIA (?) 7/2011    fainting spell >> pacemaker     Past Surgical History:   Procedure Laterality Date    CARDIAC SURG PROCEDURE UNLIST      HOLE IN HEART FIXED ASD    ENDOSCOPY, COLON, DIAGNOSTIC  9/05    HX CATARACT REMOVAL      LUCIA.     HX HEART CATHETERIZATION      3 stents    HX PACEMAKER  2011    sss ppm st judes    HX PROSTATECTOMY  3/05    green light TURP/RADIOACTIVE SEEDS    HX TONSILLECTOMY      HX UROLOGICAL      urinary stricture    NEUROLOGICAL PROCEDURE UNLISTED  age 12    skull fracture       Prior Level of Function/Environment/Context: min A PTA LE ADLs; \"to save time\" stays alone in day; set up ADLs otherwise; wife does meds etc: expect patient was functioning very marginally in the home while wife worked; expect it may be time for them to make other arrangements as do not feel patient will be safe alone in the home in the near future due to decreased cognition  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, 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 [employer]  Performance Patterns (routines, roles, habits, and rituals):       Home Situation  Home Environment: Private residence  # Steps to Enter: 0  Rails to Enter: No  Wheelchair Ramp: No  One/Two Story Residence: One story  Living Alone: No (home alone during the day)  Support Systems: Spouse/Significant Other/Partner  Patient Expects to be Discharged to[de-identified] Private residence ( )  Current DME Used/Available at Home: rosa Liang (vs home with wife and HH)  Tub or Shower Type: Shower  [x]  Right hand dominant   []  Left hand dominant    EXAMINATION OF PERFORMANCE DEFICITS:  Cognitive/Behavioral Status:  Neurologic State: Alert;Confused  Orientation Level: Oriented to person;Oriented to place; Disoriented to situation;Disoriented to time  Cognition: Follows commands;Memory loss; Impaired decision making; Impulsive;Poor safety awareness (recommend [facility] to make best discharge recommendations)  Perception: Appears intact  Perseveration: Perseverates during conversation  Safety/Judgement: Decreased awareness of need for assistance;Decreased awareness of need for safety;Decreased insight into deficits; Fall prevention        Hearing: Auditory  Auditory Impairment: Hard of hearing, bilateral (Deaf R)    Vision/Perceptual:                           Acuity:  (WFL)         Range of Motion:    AROM: Generally decreased, functional  PROM: Generally decreased, functional                      Strength:    Strength: Generally decreased, functional                Coordination:  Coordination: Generally decreased, functional  Fine Motor Skills-Upper: Left Intact; Right Intact (mild)    Gross Motor Skills-Upper: Left Intact; Right Intact (mild)    Tone & Sensation:    Tone: Normal  Sensation: Impaired (B foot neuropathy)                      Balance:  Sitting: Impaired  Sitting - Static: Fair (occasional)  Sitting - Dynamic: Fair (occasional)  Standing: Impaired  Standing - Static: Constant support; Fair  Standing - Dynamic : Poor    Functional Mobility and Transfers for ADLs:  Bed Mobility:  Rolling: Moderate assistance;Minimum assistance  Supine to Sit: Maximum assistance  Scooting: Moderate assistance    Transfers:  Sit to Stand: Minimum assistance  Stand to Sit: Minimum assistance; Moderate assistance  Bed to Chair: Moderate assistance; Additional time; Adaptive equipment  Toilet Transfer : Maximum assistance    ADL Assessment:  Feeding: Setup    Oral Facial Hygiene/Grooming: Minimum assistance; Additional time    Bathing: Moderate assistance;Maximum assistance; Additional time    Upper Body Dressing: Minimum assistance; Additional time    Lower Body Dressing: Moderate assistance;Maximum assistance; Additional time    Toileting: Additional time; Total assistance (currently incontinent and unaware)                ADL Intervention and task modifications:        initiated training for safe self care, fall prevention, energy conservation, use of TV remote, call bell, need to not stand up alone and benefit of UE AROM                             Cognitive Retraining  Safety/Judgement: Decreased awareness of need for assistance;Decreased awareness of need for safety;Decreased insight into deficits; Fall prevention      Functional Measure:  Barthel Index:    Bathin  Bladder: 0  Bowels: 5  Groomin  Dressin  Feedin  Mobility: 0  Stairs: 0  Toilet Use: 0  Transfer (Bed to Chair and Back): 5  Total: 15       Barthel and G-code impairment scale:  Percentage of impairment CH  0% CI  1-19% CJ  20-39% CK  40-59% CL  60-79% CM  80-99% CN  100%   Barthel Score 0-100 100 99-80 79-60 59-40 20-39 1-19   0   Barthel Score 0-20 20 17-19 13-16 9-12 5-8 1-4 0      The Barthel ADL Index: Guidelines  1. The index should be used as a record of what a patient does, not as a record of what a patient could do. 2. The main aim is to establish degree of independence from any help, physical or verbal, however minor and for whatever reason. 3. The need for supervision renders the patient not independent. 4. A patient's performance should be established using the best available evidence. Asking the patient, friends/relatives and nurses are the usual sources, but direct observation and common sense are also important. However direct testing is not needed. 5. Usually the patient's performance over the preceding 24-48 hours is important, but occasionally longer periods will be relevant.   6. Middle categories imply that the patient supplies over 50 per cent of the effort. 7. Use of aids to be independent is allowed. Aleshia Eldridge., Barthel, D.W. ([id number]). Functional evaluation: the Barthel Index. [facility] (14)2. MANSOOR Vick Heriberto Alliance.Lam., [place], [facility] [facility] (1999). Measuring the change indisability after inpatient rehabilitation; comparison of the responsiveness of the Barthel Index and Functional [place] Measure. Journal of Neurology, Neurosurgery, and Psychiatry, 66(4), 181-814. KEYLA Harden, CELESTE Alexis, & Marianna Luciano M.A. (2004.) Assessment of post-stroke quality of life in cost-effectiveness studies: The usefulness of the Barthel Index and the EuroQoL-5D. Quality of Life Research, 13, 553-55       G codes: In compliance with CMSs Claims Based Outcome Reporting, the following G-code set was chosen for this patient based on their primary functional limitation being treated: The outcome measure chosen to determine the severity of the functional limitation was the Barthel Index  with a score of 15/100 which was correlated with the impairment scale. ? Self Care:     - CURRENT STATUS: CM - 80%-99% impaired, limited or restricted    - GOAL STATUS: CJ - 20%-39% impaired, limited or restricted    - D/C STATUS:  ---------------To be determined---------------     Occupational Therapy Evaluation Charge Determination   History Examination Decision-Making   LOW Complexity : Brief history review  HIGH Complexity : 5 or more performance deficits relating to physical, cognitive , or psychosocial skils that result in activity limitations and / or participation restrictions MEDIUM Complexity : Patient may present with comorbidities that affect occupational performnce.  Miniml to moderate modification of tasks or assistance (eg, physical or verbal ) with assesment(s) is necessary to enable patient to complete evaluation       Based on the above components, the patient evaluation is determined to be of the following complexity level: LOW   Pain:  Pain Scale 1: Numeric (0 - 10)  Pain Intensity 1: 0              Activity Tolerance:   Primary limiting factor for functional mobility  Please refer to the flowsheet for vital signs taken during this treatment. After treatment:   [x] Patient left in no apparent distress sitting up in chair  [] Patient left in no apparent distress in bed  [x] Call bell left within reach  [x] Nursing notified  [] Caregiver present  [x] Bed alarm activated    COMMUNICATION/EDUCATION:   The patients plan of care was discussed with: Physical Therapist and Registered Nurse. [x] Home safety education was provided and the patient/caregiver indicated understanding. [x] Patient/family have participated as able in goal setting and plan of care. [x] Patient/family agree to work toward stated goals and plan of care. [] Patient understands intent and goals of therapy, but is neutral about his/her participation. [] Patient is unable to participate in goal setting and plan of care. This patients plan of care is appropriate for delegation to [facility].     Thank you for this referral.  Kojo Canales  Time Calculation: 54 mins

## 2017-12-01 NOTE — PROGRESS NOTES
Problem: Mobility Impaired (Adult and Pediatric)  Goal: *Acute Goals and Plan of Care (Insert Text)  Physical Therapy Goals  Initiated 11/30/2017  1. Patient will move from supine to sit and sit to supine  in bed with moderate assistance  within 7 day(s). 2.  Patient will transfer from bed to chair and chair to bed with minimal assistance  using the least restrictive device within 7 day(s). 3.  Patient will perform sit to stand with minimal assistance within 7 day(s). 4.  Patient will ambulate with minimal assistance/contact guard assist for 50 feet with the least restrictive device within 7 day(s). physical Therapy TREATMENT  Patient: Tiffanie Smallwood (9 y.o. male)  Date: 12/1/2017  Diagnosis: UTI (urinary tract infection) UTI (urinary tract infection)       Precautions: DNR, Fall, Skin ([place]; Deaf R; sleeps in recliner PTA)    ASSESSMENT:  Pt received supine in bed and agreeable to work with therapy. Pt found to be incontinent and required Mod A for rolling to change sheets and don brief before gait training. Max A to come to sitting with poor trunk activation but fair seated balance once in an upright position. Stood to [employer] by pulling up on handles requiring A x 2 for safety, pt reports that is how he stands at home. Able to ambulate into hallway requiring maximal verbal and manual cues to control RW and maintain close proximity as pt tends to flex forward and pushes RW too far anteriorly. Shuffling gait with occasional upper body jerking and multiple pauses. Pt returned to chair at end of session with chair alarm activated and tray table in front of pt. Pt is well below his baseline and would benefit from rehab at discharge.  Pt is appropriate for SNF level at this time to progress mobility and return to baseline level of Mod I.   Progression toward goals:  []    Improving appropriately and progressing toward goals  [x]    Improving slowly and progressing toward goals  []    Not making progress toward goals and plan of care will be adjusted     PLAN:  Patient continues to benefit from skilled intervention to address the above impairments. Continue treatment per established plan of care. Discharge Recommendations:  Skilled Nursing Facility  Further Equipment Recommendations for Discharge:  TBD     SUBJECTIVE:   Patient stated Taiwo Damian got two pretty ladies in here with me.     OBJECTIVE DATA SUMMARY:   Critical Behavior:  Neurologic State: Alert, Confused  Orientation Level: Oriented to person, Oriented to place, Disoriented to situation, Disoriented to time  Cognition: Follows commands, Memory loss, Impaired decision making, Impulsive, Poor safety awareness (recommend [facility] to make best discharge recommendations)  Safety/Judgement: Decreased awareness of need for assistance, Decreased awareness of need for safety, Decreased insight into deficits, Fall prevention  Functional Mobility Training:  Bed Mobility:  Rolling: Moderate assistance  Supine to Sit: Maximum assistance;Assist x1;Additional time     Scooting: Moderate assistance        Transfers:  Sit to Stand: Minimum assistance  Stand to Sit: Minimum assistance        Bed to Chair: Moderate assistance; Additional time; Adaptive equipment                    Balance:  Sitting: Impaired  Sitting - Static: Fair (occasional)  Sitting - Dynamic: Fair (occasional)  Standing: Impaired  Standing - Static: Fair;Constant support  Standing - Dynamic : Poor  Ambulation/Gait Training:  Distance (ft): 40 Feet (ft)  Assistive Device: Gait belt;Walker, rolling  Ambulation - Level of Assistance: Moderate assistance;Assist x1;Additional time        Gait Abnormalities: Decreased step clearance;Shuffling gait; Path deviations        Base of Support: Narrowed     Speed/Joy: Shuffled;Pace decreased (<100 feet/min); Fluctuations  Step Length: Left shortened;Right shortened                    Activity Tolerance:   Good  Please refer to the flowsheet for vital signs taken during this treatment.   After treatment:   [x]    Patient left in no apparent distress sitting up in chair  []    Patient left in no apparent distress in bed  [x]    Call bell left within reach  [x]    Nursing notified  []    Caregiver present  [x]    Bed alarm activated    COMMUNICATION/COLLABORATION:   The patients plan of care was discussed with: Registered Nurse    Amador Franklin PT   Time Calculation: 35 mins

## 2017-12-01 NOTE — PROGRESS NOTES
Hospitalist Progress Note  Simon Montero MD  Answering service: [phone] from in house phone  Cell:       Date of Service:  2017  NAME:  James ALLISON:  1927  MRN:  [id number]      Admission Summary:   this is a 80 y.o man with multiple medical problems who presents with fatigue and back pain. He is lethargic and doesn't provide much history. Per his wife he has chronic low back pain, but for the past 3 days he has been complaining of it more. She also noted that he has become increasingly fatigued and lethargic, his PO intake has decreased. He is ambulatory with a walker at baseline but has been in a chair for most of the past 2 days. There has been no documented fever, no known dysuria or hematuria. She is unaware of any specific complaints from him. There are no exacerbating of alleviating factors of his symptoms. Interval history / Subjective:     \"I dont know why I am here\". Denies any significant pain. Assessment & Plan:     UTI: (POA)  -IV ceftriaxone, stop  IV fluids he has some crackles at bases obtain chest xray. -f/u urine culture  -reportedly saw his urologist 2 weeks ago and was voiding normally     Lethargy: (POA) likely mild metabolic encephalopathy due to UTI now better answers most of the questions.     Back pain: chronic, due to DJD, possibly worsened by UTI  -acetaminophen PRN, capsaicin cream  -avoid opiates      Hypothyroidism:   -continue levothyroxine     Hyperlipidemia:  -continue atorvastatin     CAD: s/p stenting, ASD repair, pacemaker placement, chronic atrial fib.  -On  ASA, sotalol, Eliquis. Consider stopping 1 blood thinners in an elderly patient (Probably just keep on Eliquis if ok with Cardiology).     History of TIA     History of prostate CA s/p TURP     ACP was d/w the patient's wife. Treat the acute illness in hopes of recovery to baseline.  She would like for him to be DNR and she wants to fill out a DDNR.     DVT ppx: Eliquis     Disposition: home health vs SNF  Code status: DNR  DVT prophylaxis: [address] discussed with: Patient/Family  Disposition: Home w/Family and TBD     Hospital Problems  Date Reviewed: 5/4/2015          Codes Class Noted POA    * (Principal)UTI (urinary tract infection) ICD-10-CM: N39.0  ICD-9-CM: 599.0  11/30/2017 Unknown                Review of Systems:   A comprehensive review of systems was negative except for that written in the HPI. Vital Signs:    Last 24hrs VS reviewed since prior progress note. Most recent are:  Visit Vitals    /83 (BP 1 Location: Right arm, BP Patient Position: At rest)    Pulse 70    Temp 98 °F (36.7 °C)    Resp 16    Ht 5' 8\" (1.727 m)    Wt 73 kg (161 lb)    SpO2 98%    BMI 24.48 kg/m2         Intake/Output Summary (Last 24 hours) at 12/01/17 1590  Last data filed at 11/30/17 5908   Gross per 24 hour   Intake                0 ml   Output               50 ml   Net              -50 ml        Physical Examination:             Constitutional:  No acute distress, cooperative, pleasant    ENT:  Oral mucous moist, oropharynx benign. Neck supple,    Resp:  CTA bilaterally. No wheezing/rhonchi/rales. No accessory muscle use   CV:  Regular rhythm, normal rate, no murmurs, gallops, rubs    GI:  Soft, non distended, non tender. normoactive bowel sounds, no hepatosplenomegaly     Musculoskeletal:  No edema, warm, 2+ pulses throughout    Neurologic:  Moves all extremities. AAOx3, CN II-XII reviewed     Psych:  Good insight, Not anxious nor agitated.        Data Review:    Review and/or order of clinical lab test      Labs:     Recent Labs      12/01/17   0626  11/30/17   0829   WBC  12.7*  13.0*   HGB  13.2  15.3   HCT  40.5  46.7   PLT  115*  158     Recent Labs      12/01/17   0626  11/30/17   0829   NA  139  138   K  4.3  4.4   CL  105  102   CO2  29  31   BUN  14  14   CREA  0.85  1.04   GLU  97  100   CA  8.3*  8.7 MG  2.1   --    PHOS  2.7   --      Recent Labs      11/30/17   0829   SGOT  20   ALT  18   AP  57   TBILI  0.5   TP  6.6   ALB  3.2*   GLOB  3.4   LPSE  123     Recent Labs      11/30/17   0829   INR  1.1   PTP  10.9   APTT  33.6*      No results for input(s): FE, TIBC, PSAT, FERR in the last 72 hours. Lab Results   Component Value Date/Time    Folate 11.3 07/21/2011 03:27 AM      No results for input(s): PH, PCO2, PO2 in the last 72 hours.   Recent Labs      11/30/17   0829   TROIQ  <0.04     Lab Results   Component Value Date/Time    Cholesterol, total 120 11/02/2012 04:40 AM    HDL Cholesterol 33 11/02/2012 04:40 AM    LDL, calculated 65.6 11/02/2012 04:40 AM    Triglyceride 107 11/02/2012 04:40 AM    CHOL/HDL Ratio 3.6 11/02/2012 04:40 AM     Lab Results   Component Value Date/Time    Glucose (POC) 100 05/04/2015 08:40 AM    Glucose (POC) 97 09/27/2012 06:31 AM    Glucose (POC) 96 07/21/2011 03:36 AM    Glucose (POC) 93 07/21/2011 03:15 AM     Lab Results   Component Value Date/Time    Color YELLOW/STRAW 11/30/2017 08:29 AM    Appearance CLOUDY 11/30/2017 08:29 AM    Specific gravity 1.015 11/30/2017 08:29 AM    pH (UA) 6.5 11/30/2017 08:29 AM    Protein NEGATIVE  11/30/2017 08:29 AM    Glucose NEGATIVE  11/30/2017 08:29 AM    Ketone NEGATIVE  11/30/2017 08:29 AM    Bilirubin NEGATIVE  11/30/2017 08:29 AM    Urobilinogen 0.2 11/30/2017 08:29 AM    Nitrites POSITIVE 11/30/2017 08:29 AM    Leukocyte Esterase LARGE 11/30/2017 08:29 AM    Epithelial cells FEW 11/30/2017 08:29 AM    Bacteria 3+ 11/30/2017 08:29 AM    WBC  11/30/2017 08:29 AM    RBC 0-5 11/30/2017 08:29 AM         Medications Reviewed:     Current Facility-Administered Medications   Medication Dose Route Frequency    apixaban (ELIQUIS) tablet 5 mg  5 mg Oral Q12H    aspirin delayed-release tablet 81 mg  81 mg Oral DAILY    donepezil (ARICEPT) tablet 10 mg  10 mg Oral DAILY WITH BREAKFAST    [START ON 12/2/2017] levothyroxine (SYNTHROID) tablet 50 mcg  50 mcg Oral Once per day on Sun Tue Wed Thu Sat    levothyroxine (SYNTHROID) tablet 100 mcg  100 mcg Oral EVERY MON & FRI    memantine (NAMENDA) tablet 10 mg  10 mg Oral BID    simvastatin (ZOCOR) tablet 10 mg  10 mg Oral QHS    sotalol (BETAPACE) tablet 80 mg  80 mg Oral BID    oxybutynin chloride XL (DITROPAN XL) tablet 5 mg  5 mg Oral QHS    cefTRIAXone (ROCEPHIN) 1 g in 0.9% sodium chloride (MBP/ADV) 50 mL  1 g IntraVENous Q24H    sodium chloride (NS) flush 5-10 mL  5-10 mL IntraVENous Q8H    sodium chloride (NS) flush 5-10 mL  5-10 mL IntraVENous PRN    capsicum oleoresin 0.025 % cream   Topical TID    acetaminophen (TYLENOL) tablet 500 mg  500 mg Oral Q6H PRN     ______________________________________________________________________  EXPECTED LENGTH OF STAY: - - -  ACTUAL LENGTH OF STAY:          1                 Van Hodge MD

## 2017-12-02 LAB
ANION GAP SERPL CALC-SCNC: 8 MMOL/L (ref 5–15)
BACTERIA SPEC CULT: ABNORMAL
BUN SERPL-MCNC: 13 MG/DL (ref 6–20)
BUN/CREAT SERPL: 15 (ref 12–20)
CALCIUM SERPL-MCNC: 8.3 MG/DL (ref 8.5–10.1)
CC UR VC: ABNORMAL
CHLORIDE SERPL-SCNC: 103 MMOL/L (ref 97–108)
CO2 SERPL-SCNC: 29 MMOL/L (ref 21–32)
CREAT SERPL-MCNC: 0.85 MG/DL (ref 0.7–1.3)
ERYTHROCYTE [DISTWIDTH] IN BLOOD BY AUTOMATED COUNT: 14.2 % (ref 11.5–14.5)
GLUCOSE SERPL-MCNC: 94 MG/DL (ref 65–100)
HCT VFR BLD AUTO: 40.8 % (ref 36.6–50.3)
HGB BLD-MCNC: 13.4 G/DL (ref 12.1–17)
MCH RBC QN AUTO: 29.8 PG (ref 26–34)
MCHC RBC AUTO-ENTMCNC: 32.8 G/DL (ref 30–36.5)
MCV RBC AUTO: 90.9 FL (ref 80–99)
PLATELET # BLD AUTO: 134 K/UL (ref 150–400)
POTASSIUM SERPL-SCNC: 4.1 MMOL/L (ref 3.5–5.1)
RBC # BLD AUTO: 4.49 M/UL (ref 4.1–5.7)
SERVICE CMNT-IMP: ABNORMAL
SODIUM SERPL-SCNC: 140 MMOL/L (ref 136–145)
WBC # BLD AUTO: 9.4 K/UL (ref 4.1–11.1)

## 2017-12-02 PROCEDURE — 80048 BASIC METABOLIC PNL TOTAL CA: CPT | Performed by: HOSPITALIST

## 2017-12-02 PROCEDURE — 36415 COLL VENOUS BLD VENIPUNCTURE: CPT | Performed by: HOSPITALIST

## 2017-12-02 PROCEDURE — 85027 COMPLETE CBC AUTOMATED: CPT | Performed by: HOSPITALIST

## 2017-12-02 PROCEDURE — 74011000258 HC RX REV CODE- 258: Performed by: HOSPITALIST

## 2017-12-02 PROCEDURE — 65270000032 HC RM SEMIPRIVATE

## 2017-12-02 PROCEDURE — 74011250637 HC RX REV CODE- 250/637: Performed by: HOSPITALIST

## 2017-12-02 PROCEDURE — 74011250636 HC RX REV CODE- 250/636: Performed by: HOSPITALIST

## 2017-12-02 RX ADMIN — APIXABAN 5 MG: 5 TABLET, FILM COATED ORAL at 21:24

## 2017-12-02 RX ADMIN — SIMVASTATIN 10 MG: 10 TABLET, FILM COATED ORAL at 21:24

## 2017-12-02 RX ADMIN — OXYBUTYNIN CHLORIDE 5 MG: 5 TABLET, EXTENDED RELEASE ORAL at 21:24

## 2017-12-02 RX ADMIN — APIXABAN 5 MG: 5 TABLET, FILM COATED ORAL at 08:57

## 2017-12-02 RX ADMIN — Medication 10 ML: at 14:00

## 2017-12-02 RX ADMIN — MEMANTINE 10 MG: 10 TABLET ORAL at 08:57

## 2017-12-02 RX ADMIN — CEFTRIAXONE 1 G: 1 INJECTION, POWDER, FOR SOLUTION INTRAMUSCULAR; INTRAVENOUS at 09:35

## 2017-12-02 RX ADMIN — DONEPEZIL HYDROCHLORIDE 10 MG: 10 TABLET, FILM COATED ORAL at 07:06

## 2017-12-02 RX ADMIN — LEVOTHYROXINE SODIUM 50 MCG: 50 TABLET ORAL at 07:10

## 2017-12-02 RX ADMIN — Medication 10 ML: at 21:24

## 2017-12-02 RX ADMIN — CAPSAICIN: 0.25 CREAM TOPICAL at 21:25

## 2017-12-02 RX ADMIN — Medication 10 ML: at 07:07

## 2017-12-02 RX ADMIN — SOTALOL HYDROCHLORIDE 80 MG: 80 TABLET ORAL at 17:53

## 2017-12-02 RX ADMIN — SOTALOL HYDROCHLORIDE 80 MG: 80 TABLET ORAL at 08:57

## 2017-12-02 RX ADMIN — CAPSAICIN: 0.25 CREAM TOPICAL at 08:58

## 2017-12-02 RX ADMIN — CAPSAICIN: 0.25 CREAM TOPICAL at 16:00

## 2017-12-02 RX ADMIN — ASPIRIN 81 MG: 81 TABLET, COATED ORAL at 08:57

## 2017-12-02 RX ADMIN — MEMANTINE 10 MG: 10 TABLET ORAL at 17:53

## 2017-12-02 NOTE — ROUTINE PROCESS
Bedside shift change report given to JENELLE Camp (oncoming nurse) by Berna Flynn (offgoing nurse). Report included the following information SBAR, Kardex, Intake/Output and MAR.

## 2017-12-02 NOTE — PROGRESS NOTES
Bedside shift change report given to Keyla (oncoming nurse) by Kendra Og (offgoing nurse). Report included the following information SBAR.

## 2017-12-02 NOTE — PROGRESS NOTES
Bedside shift change report given to Corinne, RN (oncoming nurse) by Rei Munoz RN (offgoing nurse). Report included the following information SBAR, Kardex, Intake/Output and Recent Results.

## 2017-12-02 NOTE — PROGRESS NOTES
Hospitalist Progress Note  Rosendo Mack NP  Answering service: [phone] from in house phone  Cell: [phone]      Date of Service:  2017  NAME:  Yazmin Munson YOB: 1927  MRN:  [id number]    Admission Summary:   Pt presented to the ED with fatigue and back pain. On initial assessment he was lethargic and didnt provide much history. Per his wife, he has chronic low back pain, but for the past 3 days he has been complaining of it more. She also noted that he had become increasingly fatigued and lethargic, and not eating as much. He is ambulatory with a walker at baseline but has been in a chair for most of the past 2 days. No documented fever, no known dysuria or hematuria. Interval history / Subjective:   Pt sitting up in bed, no new complaints. Very cheerful, wife at bedside. Assessment & Plan:     UTI (POA):  - continue with IV ceftriaxone  - urine culture with GNR, monitor for C&S  - reportedly saw his urologist 2 weeks ago and was voiding normally     Lethargy and Acute Encephalopthy (POA): improved  - likely mild metabolic encephalopathy due to UTI   - now answers most of the questions. Wife reports he has troubles with current events and time at home. He is not quite at baseline per her this am. Has some noted confusion     Back pain: not significant factor this am  - chronic, due to DJD, possibly worsened by UTI  - acetaminophen PRN, capsaicin cream  - avoid opiates      Hypothyroidism: continue levothyroxine     Hyperlipidemia: continue atorvastatin     Hx CAD:  - s/p stenting, ASD repair, pacemaker placement, chronic atrial fib.  -On  ASA, sotalol, Eliquis. - Consider stopping 1 blood thinners in an elderly patient (Probably just keep on Eliquis if ok with Cardiology).     History of TIA: on asa and statin     History of prostate CA: s/p TURP      Code status: DNR  ACP was d/w the patient's wife.  Treat the acute illness in hopes of recovery to baseline. She would like for him to be DNR and she wants to fill out a DDNR. DVT prophylaxis: Eliquis  Care Plan discussed with: patient, wife, nursing  Disposition: SNF    Returned to room to fill out DDNR with pts wife but she was no longer there. Paperwork placed in chart, will attempt at a later time. Hospital Problems  Date Reviewed: 5/4/2015          Codes Class Noted POA    * (Principal)UTI (urinary tract infection) ICD-10-CM: N39.0  ICD-9-CM: 599.0  11/30/2017 Unknown            Review of Systems:   Denies HA. No chest pain or pressure. No abdominal complaints. No SOB. Mild Pain to R great toe. Vital Signs:    Last 24hrs VS reviewed since prior progress note. Most recent are:  Visit Vitals    /77    Pulse 70    Temp 98 °F (36.7 °C)    Resp 18    Ht 5' 8\" (1.727 m)    Wt 73 kg (161 lb)    SpO2 98%    BMI 24.48 kg/m2     No intake or output data in the 24 hours ending 12/02/17 0797     Physical Examination:         Constitutional:  No acute distress, cooperative, pleasant    ENT:  Oral mucous membranes moist, oropharynx benign. Resp:  CTA bilaterally. No wheezing. No accessory muscle use, on RA   CV:  Regular rhythm, normal rate, no murmurs    GI:  Soft, non distended, non tender. normoactive bowel sounds +BM    Musculoskeletal:  No edema, warm, 2+ pulses throughout    Neurologic:  Moves all extremities. AAOx 2    Psych:  Good insight, Not anxious nor agitated.        Data Review:   Review and/or order of clinical lab test     Labs:     Recent Labs      12/02/17 0125 12/01/17   0626   WBC  9.4  12.7*   HGB  13.4  13.2   HCT  40.8  40.5   PLT  134*  115*     Recent Labs      12/02/17   0125  12/01/17   0626  11/30/17   0829   NA  140  139  138   K  4.1  4.3  4.4   CL  103  105  102   CO2  29  29  31   BUN  13  14  14   CREA  0.85  0.85  1.04   GLU  94  97  100   CA  8.3*  8.3*  8.7   MG   --   2.1   --    PHOS   --   2.7   --      Recent Labs 11/30/17 0829   SGOT  20   ALT  18   AP  57   TBILI  0.5   TP  6.6   ALB  3.2*   GLOB  3.4   LPSE  123     Recent Labs      11/30/17 0829   INR  1.1   PTP  10.9   APTT  33.6*      No results for input(s): FE, TIBC, PSAT, FERR in the last 72 hours. Lab Results   Component Value Date/Time    Folate 11.3 07/21/2011 03:27 AM      No results for input(s): PH, PCO2, PO2 in the last 72 hours.   Recent Labs      11/30/17 0829   TROIQ  <0.04     Lab Results   Component Value Date/Time    Cholesterol, total 120 11/02/2012 04:40 AM    HDL Cholesterol 33 11/02/2012 04:40 AM    LDL, calculated 65.6 11/02/2012 04:40 AM    Triglyceride 107 11/02/2012 04:40 AM    CHOL/HDL Ratio 3.6 11/02/2012 04:40 AM     Lab Results   Component Value Date/Time    Glucose (POC) 100 05/04/2015 08:40 AM    Glucose (POC) 97 09/27/2012 06:31 AM    Glucose (POC) 96 07/21/2011 03:36 AM    Glucose (POC) 93 07/21/2011 03:15 AM     Lab Results   Component Value Date/Time    Color YELLOW/STRAW 11/30/2017 08:29 AM    Appearance CLOUDY 11/30/2017 08:29 AM    Specific gravity 1.015 11/30/2017 08:29 AM    pH (UA) 6.5 11/30/2017 08:29 AM    Protein NEGATIVE  11/30/2017 08:29 AM    Glucose NEGATIVE  11/30/2017 08:29 AM    Ketone NEGATIVE  11/30/2017 08:29 AM    Bilirubin NEGATIVE  11/30/2017 08:29 AM    Urobilinogen 0.2 11/30/2017 08:29 AM    Nitrites POSITIVE 11/30/2017 08:29 AM    Leukocyte Esterase LARGE 11/30/2017 08:29 AM    Epithelial cells FEW 11/30/2017 08:29 AM    Bacteria 3+ 11/30/2017 08:29 AM    WBC  11/30/2017 08:29 AM    RBC 0-5 11/30/2017 08:29 AM     Medications Reviewed:     Current Facility-Administered Medications   Medication Dose Route Frequency    donepezil (ARICEPT) tablet 10 mg  10 mg Oral DAILY WITH BREAKFAST    apixaban (ELIQUIS) tablet 5 mg  5 mg Oral Q12H    aspirin delayed-release tablet 81 mg  81 mg Oral DAILY    levothyroxine (SYNTHROID) tablet 50 mcg  50 mcg Oral Once per day on Sun Tue Wed Thu Sat    levothyroxine (SYNTHROID) tablet 100 mcg  100 mcg Oral EVERY MON & FRI    memantine (NAMENDA) tablet 10 mg  10 mg Oral BID    simvastatin (ZOCOR) tablet 10 mg  10 mg Oral QHS    sotalol (BETAPACE) tablet 80 mg  80 mg Oral BID    oxybutynin chloride XL (DITROPAN XL) tablet 5 mg  5 mg Oral QHS    cefTRIAXone (ROCEPHIN) 1 g in 0.9% sodium chloride (MBP/ADV) 50 mL  1 g IntraVENous Q24H    sodium chloride (NS) flush 5-10 mL  5-10 mL IntraVENous Q8H    sodium chloride (NS) flush 5-10 mL  5-10 mL IntraVENous PRN    capsicum oleoresin 0.025 % cream   Topical TID    acetaminophen (TYLENOL) tablet 500 mg  500 mg Oral Q6H PRN   ______________________________________________________________________  EXPECTED LENGTH OF STAY: 3d 21h  ACTUAL LENGTH OF STAY:          2               Ilsa Whatley NP

## 2017-12-03 PROCEDURE — 74011250637 HC RX REV CODE- 250/637: Performed by: HOSPITALIST

## 2017-12-03 PROCEDURE — 74011000258 HC RX REV CODE- 258: Performed by: HOSPITALIST

## 2017-12-03 PROCEDURE — 65270000032 HC RM SEMIPRIVATE

## 2017-12-03 PROCEDURE — 74011250636 HC RX REV CODE- 250/636: Performed by: HOSPITALIST

## 2017-12-03 RX ADMIN — APIXABAN 5 MG: 5 TABLET, FILM COATED ORAL at 22:14

## 2017-12-03 RX ADMIN — ACETAMINOPHEN 500 MG: 500 TABLET, FILM COATED ORAL at 09:41

## 2017-12-03 RX ADMIN — CAPSAICIN: 0.25 CREAM TOPICAL at 15:19

## 2017-12-03 RX ADMIN — LEVOTHYROXINE SODIUM 50 MCG: 50 TABLET ORAL at 07:26

## 2017-12-03 RX ADMIN — Medication 10 ML: at 15:19

## 2017-12-03 RX ADMIN — Medication 10 ML: at 22:14

## 2017-12-03 RX ADMIN — Medication 10 ML: at 07:23

## 2017-12-03 RX ADMIN — CEFTRIAXONE 1 G: 1 INJECTION, POWDER, FOR SOLUTION INTRAMUSCULAR; INTRAVENOUS at 09:42

## 2017-12-03 RX ADMIN — SIMVASTATIN 10 MG: 10 TABLET, FILM COATED ORAL at 22:14

## 2017-12-03 RX ADMIN — APIXABAN 5 MG: 5 TABLET, FILM COATED ORAL at 09:41

## 2017-12-03 RX ADMIN — SOTALOL HYDROCHLORIDE 80 MG: 80 TABLET ORAL at 18:09

## 2017-12-03 RX ADMIN — MEMANTINE 10 MG: 10 TABLET ORAL at 09:41

## 2017-12-03 RX ADMIN — Medication 10 ML: at 15:20

## 2017-12-03 RX ADMIN — CAPSAICIN: 0.25 CREAM TOPICAL at 22:15

## 2017-12-03 RX ADMIN — SOTALOL HYDROCHLORIDE 80 MG: 80 TABLET ORAL at 10:03

## 2017-12-03 RX ADMIN — MEMANTINE 10 MG: 10 TABLET ORAL at 18:09

## 2017-12-03 RX ADMIN — ASPIRIN 81 MG: 81 TABLET, COATED ORAL at 09:41

## 2017-12-03 RX ADMIN — OXYBUTYNIN CHLORIDE 5 MG: 5 TABLET, EXTENDED RELEASE ORAL at 22:14

## 2017-12-03 RX ADMIN — DONEPEZIL HYDROCHLORIDE 10 MG: 10 TABLET, FILM COATED ORAL at 07:23

## 2017-12-03 RX ADMIN — CAPSAICIN: 0.25 CREAM TOPICAL at 09:45

## 2017-12-03 NOTE — PROGRESS NOTES
Hospitalist Progress Note  Mary Killian NP  Answering service: [phone] from in house phone  Cell: [phone]   Date of Service:  12/3/2017  NAME:  Gibran Jiang  :  1927  MRN:  [id number]    Admission Summary:   Pt presented to the ED with fatigue and back pain. On initial assessment he was lethargic and didnt provide much history. Per his wife, he has chronic low back pain, but for the past 3 days he has been complaining of it more. She also noted that he had become increasingly fatigued and lethargic, and not eating as much. He is ambulatory with a walker at baseline but has been in a chair for most of the past 2 days. No documented fever, no known dysuria or hematuria. Interval history / Subjective:   Pt in bed, no new complaints. Still cheerful today. Assessment & Plan:     UTI (POA):  - continue with IV ceftriaxone, today is day 4 of treatment  - e coli, pan-sensitive, will need to convert to po abx on discharge  - reportedly saw his urologist 2 weeks ago and was voiding normally     Lethargy and Acute Encephalopthy (POA): improved  - likely mild metabolic encephalopathy due to UTI   - now answers most of the questions. Wife reports he has troubles with current events and time at home. Some confusion     Back pain: not significant factor this am  - chronic, due to DJD, possibly worsened by UTI  - acetaminophen PRN, capsaicin cream  - avoid opiates      Hypothyroidism: continue levothyroxine     Hyperlipidemia: continue atorvastatin     Hx CAD:  - s/p stenting, ASD repair, pacemaker placement, chronic atrial fib.  -On  ASA, sotalol, Eliquis. - Consider stopping 1 blood thinner in an elderly patient (Probably just keep on Eliquis if ok with Cardiology).  Can discuss this at next cardiology appt.     History of TIA: on asa and statin     History of prostate CA: s/p TURP      Code status: DNR  ACP was d/w the patient's wife (by previous attending MD). Treat the acute illness in hopes of recovery to baseline. She would like for him to be DNR and she wants to fill out a DDNR. DVT prophylaxis: Eliquis  Care Plan discussed with: patient, attending MD  Disposition: SNF - awaiting acceptance to [address] [place] [address]    Need to complete DDNR with pts wife but she was not there this am, will try to attempt once again this afternoon. Hospital Problems  Date Reviewed: 5/4/2015          Codes Class Noted POA    * (Principal)UTI (urinary tract infection) ICD-10-CM: N39.0  ICD-9-CM: 599.0  11/30/2017 Unknown            Review of Systems:   Denies HA. No chest pain or pressure. No abdominal complaints. No SOB. Vital Signs:    Last 24hrs VS reviewed since prior progress note. Most recent are:  Visit Vitals    /74 (BP 1 Location: Left arm, BP Patient Position: At rest;Sitting)    Pulse 70    Temp 97.5 °F (36.4 °C)    Resp 20    Ht 5' 8\" (1.727 m)    Wt 73 kg (161 lb)    SpO2 95%    BMI 24.48 kg/m2       Intake/Output Summary (Last 24 hours) at 12/03/17 1148  Last data filed at 12/02/17 1735   Gross per 24 hour   Intake              240 ml   Output                0 ml   Net              240 ml        Physical Examination:         Constitutional:  No acute distress, cooperative, pleasant    ENT:  Oral mucous membranes moist. 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 Resp:  CTA bilaterally. No wheezing. No accessory muscle use, on RA   CV:  Regular rhythm, normal rate, no murmurs    GI:  Soft, non distended, non tender. normoactive bowel sounds +BM    Musculoskeletal:  No edema, warm, 2+ pulses throughout    Neurologic:  Moves all extremities. AAOx 2    Psych:  Good insight, Not anxious nor agitated.        Data Review:   Review and/or order of clinical lab test     Labs:     Recent Labs      12/02/17   0125  12/01/17   0626   WBC  9.4  12.7*   HGB  13.4  13.2   HCT  40.8  40.5   PLT  134*  115* Recent Labs      12/02/17   0125  12/01/17   0626   NA  140  139   K  4.1  4.3   CL  103  105   CO2  29  29   BUN  13  14   CREA  0.85  0.85   GLU  94  97   CA  8.3*  8.3*   MG   --   2.1   PHOS   --   2.7     No results for input(s): SGOT, GPT, ALT, AP, TBIL, TBILI, TP, ALB, GLOB, GGT, AML, LPSE in the last 72 hours. No lab exists for component: AMYP, HLPSE  No results for input(s): INR, PTP, APTT in the last 72 hours. No lab exists for component: INREXT, INREXT   No results for input(s): FE, TIBC, PSAT, FERR in the last 72 hours. Lab Results   Component Value Date/Time    Folate 11.3 07/21/2011 03:27 AM      No results for input(s): PH, PCO2, PO2 in the last 72 hours. No results for input(s): CPK, CKNDX, TROIQ in the last 72 hours.     No lab exists for component: CPKMB  Lab Results   Component Value Date/Time    Cholesterol, total 120 11/02/2012 04:40 AM    HDL Cholesterol 33 11/02/2012 04:40 AM    LDL, calculated 65.6 11/02/2012 04:40 AM    Triglyceride 107 11/02/2012 04:40 AM    CHOL/HDL Ratio 3.6 11/02/2012 04:40 AM     Lab Results   Component Value Date/Time    Glucose (POC) 100 05/04/2015 08:40 AM    Glucose (POC) 97 09/27/2012 06:31 AM    Glucose (POC) 96 07/21/2011 03:36 AM    Glucose (POC) 93 07/21/2011 03:15 AM     Lab Results   Component Value Date/Time    Color YELLOW/STRAW 11/30/2017 08:29 AM    Appearance CLOUDY 11/30/2017 08:29 AM    Specific gravity 1.015 11/30/2017 08:29 AM    pH (UA) 6.5 11/30/2017 08:29 AM    Protein NEGATIVE  11/30/2017 08:29 AM    Glucose NEGATIVE  11/30/2017 08:29 AM    Ketone NEGATIVE  11/30/2017 08:29 AM    Bilirubin NEGATIVE  11/30/2017 08:29 AM    Urobilinogen 0.2 11/30/2017 08:29 AM    Nitrites POSITIVE 11/30/2017 08:29 AM    Leukocyte Esterase LARGE 11/30/2017 08:29 AM    Epithelial cells FEW 11/30/2017 08:29 AM    Bacteria 3+ 11/30/2017 08:29 AM    WBC  11/30/2017 08:29 AM    RBC 0-5 11/30/2017 08:29 AM     Medications Reviewed:     Current Facility-Administered Medications   Medication Dose Route Frequency    influenza vaccine 2017-18 (65 yrs+)(PF) (FLUZONE HIGH-DOSE) injection 0.5 mL  0.5 mL IntraMUSCular PRIOR TO DISCHARGE    donepezil (ARICEPT) tablet 10 mg  10 mg Oral DAILY WITH BREAKFAST    apixaban (ELIQUIS) tablet 5 mg  5 mg Oral Q12H    aspirin delayed-release tablet 81 mg  81 mg Oral DAILY    levothyroxine (SYNTHROID) tablet 50 mcg  50 mcg Oral Once per day on Sun Tue Wed Thu Sat    levothyroxine (SYNTHROID) tablet 100 mcg  100 mcg Oral EVERY MON & FRI    memantine (NAMENDA) tablet 10 mg  10 mg Oral BID    simvastatin (ZOCOR) tablet 10 mg  10 mg Oral QHS    sotalol (BETAPACE) tablet 80 mg  80 mg Oral BID    oxybutynin chloride XL (DITROPAN XL) tablet 5 mg  5 mg Oral QHS    cefTRIAXone (ROCEPHIN) 1 g in 0.9% sodium chloride (MBP/ADV) 50 mL  1 g IntraVENous Q24H    sodium chloride (NS) flush 5-10 mL  5-10 mL IntraVENous Q8H    sodium chloride (NS) flush 5-10 mL  5-10 mL IntraVENous PRN    capsicum oleoresin 0.025 % cream   Topical TID    acetaminophen (TYLENOL) tablet 500 mg  500 mg Oral Q6H PRN   ______________________________________________________________________  EXPECTED LENGTH OF STAY: 3d 21h  ACTUAL LENGTH OF STAY:          3               Fallon Adan NP

## 2017-12-03 NOTE — PROGRESS NOTES
Bedside shift change report given to Danielle Navarrete RN (oncoming nurse) by Michelle Curtis RN (offgoing nurse). Report included the following information SBAR, Kardex, Intake/Output and Recent Results.

## 2017-12-03 NOTE — PROGRESS NOTES
Bedside and Verbal shift change report given to Charlie Nation RN (oncoming nurse) by Mary Driver RN (offgoing nurse). Report included the following information SBAR, Kardex, ED Summary, Intake/Output, MAR and Recent Results.

## 2017-12-04 VITALS
HEART RATE: 79 BPM | HEIGHT: 68 IN | WEIGHT: 161 LBS | TEMPERATURE: 97.6 F | SYSTOLIC BLOOD PRESSURE: 137 MMHG | OXYGEN SATURATION: 96 % | RESPIRATION RATE: 16 BRPM | DIASTOLIC BLOOD PRESSURE: 75 MMHG | BODY MASS INDEX: 24.4 KG/M2

## 2017-12-04 PROCEDURE — 74011250637 HC RX REV CODE- 250/637: Performed by: HOSPITALIST

## 2017-12-04 PROCEDURE — 74011000258 HC RX REV CODE- 258: Performed by: HOSPITALIST

## 2017-12-04 PROCEDURE — 97116 GAIT TRAINING THERAPY: CPT | Performed by: PHYSICAL THERAPIST

## 2017-12-04 PROCEDURE — 74011250636 HC RX REV CODE- 250/636: Performed by: HOSPITALIST

## 2017-12-04 RX ORDER — CEFUROXIME AXETIL 250 MG/1
250 TABLET ORAL 2 TIMES DAILY
Qty: 4 TAB | Refills: 0 | Status: SHIPPED
Start: 2017-12-04 | End: 2017-12-04

## 2017-12-04 RX ORDER — CEFDINIR 300 MG/1
300 CAPSULE ORAL 2 TIMES DAILY
Qty: 4 CAP | Refills: 0 | Status: SHIPPED
Start: 2017-12-04 | End: 2017-12-06

## 2017-12-04 RX ADMIN — CAPSAICIN: 0.25 CREAM TOPICAL at 11:01

## 2017-12-04 RX ADMIN — DONEPEZIL HYDROCHLORIDE 10 MG: 10 TABLET, FILM COATED ORAL at 07:05

## 2017-12-04 RX ADMIN — APIXABAN 5 MG: 5 TABLET, FILM COATED ORAL at 10:32

## 2017-12-04 RX ADMIN — ASPIRIN 81 MG: 81 TABLET, COATED ORAL at 10:32

## 2017-12-04 RX ADMIN — SOTALOL HYDROCHLORIDE 80 MG: 80 TABLET ORAL at 10:31

## 2017-12-04 RX ADMIN — Medication 10 ML: at 07:05

## 2017-12-04 RX ADMIN — CEFTRIAXONE 1 G: 1 INJECTION, POWDER, FOR SOLUTION INTRAMUSCULAR; INTRAVENOUS at 10:51

## 2017-12-04 RX ADMIN — LEVOTHYROXINE SODIUM 100 MCG: 50 TABLET ORAL at 07:05

## 2017-12-04 RX ADMIN — MEMANTINE 10 MG: 10 TABLET ORAL at 10:32

## 2017-12-04 NOTE — PROGRESS NOTES
Bedside and Verbal shift change report given to Umesh Villela RN  (oncoming nurse) by Celeste Sousa (offgoing nurse). Report included the following information SBAR and Kardex.

## 2017-12-04 NOTE — PROGRESS NOTES
Problem: Mobility Impaired (Adult and Pediatric)  Goal: *Acute Goals and Plan of Care (Insert Text)  Physical Therapy Goals  Initiated 11/30/2017  1. Patient will move from supine to sit and sit to supine  in bed with moderate assistance  within 7 day(s). 2.  Patient will transfer from bed to chair and chair to bed with minimal assistance  using the least restrictive device within 7 day(s). 3.  Patient will perform sit to stand with minimal assistance within 7 day(s). 4.  Patient will ambulate with minimal assistance/contact guard assist for 50 feet with the least restrictive device within 7 day(s). physical Therapy TREATMENT  Patient: Nahid Jiménez (36 y.o. male)  Date: 12/4/2017  Diagnosis: UTI (urinary tract infection) UTI (urinary tract infection)       Precautions: DNR, Fall, Skin ([place] L; Deaf R; sleeps in recliner PTA)    ASSESSMENT:  Patient making steady progress toward goals. Patient received supine in bed and agreeable to PT. Very [place] and hears best out of L ear and needs simple, clear instruction. Supine to sit with Krystin-modA with extra time to complete task. Sit to stand initially with modA but once instructed in correct technique (edge of chair, nose over toes) he is able to stand with CGA-Krystin. Amb approx 80 feet with RW and Krystin-modA (Krystin for balance and modA to manage RW). He displays shuffling pattern initially but improves with cues. Festinating gait when attempting to make any turns. Tends to keep RW too far in front of him and difficulty correcting this without manual cues. He is not safe to DC home with wife at this time as he continues to need quite a bit of assistance to be safe during ambulation. Patient very motivated and very appropriate for SNF level rehab.   Progression toward goals:  [x]    Improving appropriately and progressing toward goals  []    Improving slowly and progressing toward goals  []    Not making progress toward goals and plan of care will be adjusted PLAN:  Patient continues to benefit from skilled intervention to address the above impairments. Continue treatment per established plan of care. Discharge Recommendations:  Skilled Nursing Facility  Further Equipment Recommendations for Discharge:  TBD     SUBJECTIVE:   Patient stated It's going to be hard to teach a 80year old how to walk again.     OBJECTIVE DATA SUMMARY:   Critical Behavior:  Neurologic State: Alert, Confused  Orientation Level: Oriented to person, Oriented to place, Disoriented to situation, Disoriented to time  Cognition: Follows commands  Safety/Judgement: Decreased awareness of need for assistance, Decreased awareness of need for safety, Decreased insight into deficits, Fall prevention  Functional Mobility Training:  Bed Mobility:  Rolling: Minimum assistance  Supine to Sit: Moderate assistance;Assist x1     Scooting: Minimum assistance;Assist x1        Transfers:  Sit to Stand: Minimum assistance;Assist x1  Stand to Sit: Minimum assistance;Assist x1                             Balance:  Sitting: Intact  Sitting - Static: Good (unsupported)  Sitting - Dynamic: Good (unsupported)  Standing: Impaired  Standing - Static: Constant support; Fair  Standing - Dynamic : Fair  Ambulation/Gait Training:  Distance (ft): 80 Feet (ft)  Assistive Device: Gait belt;Walker, rolling  Ambulation - Level of Assistance: Moderate assistance;Assist x1        Gait Abnormalities: Decreased step clearance;Shuffling gait (festinating gait)        Base of Support: Narrowed     Speed/Joy: Accelerated;Pace decreased (<100 feet/min); Shuffled; Slow  Step Length: Left shortened;Right shortened          Therapeutic Exercises:   Instructed in seated HEP:  Marching  Hip add with pillow  LAQ  Ankle pumps  *needs verbal and visual cues for correct technique  Pain:  Pain Scale 1: Numeric (0 - 10)  Pain Intensity 1: 0              Activity Tolerance:   VSS  Please refer to the flowsheet for vital signs taken during this treatment.   After treatment:   [x]    Patient left in no apparent distress sitting up in chair  []    Patient left in no apparent distress in bed  [x]    Call bell left within reach  [x]    Nursing notified  [x]    Caregiver present  [x]    Chair alarm activated    COMMUNICATION/COLLABORATION:   The patients plan of care was discussed with: Physical Therapist and Registered Nurse    Eladia Nguyen, PT, DPT   Time Calculation: 21 mins

## 2017-12-04 NOTE — DISCHARGE SUMMARY
Discharge Summary       PATIENT ID: Lola Barrow  MRN: [id number]   YOB: 1927    DATE OF ADMISSION: 11/30/2017  7:27 AM    DATE OF DISCHARGE: 12/4/2017   PRIMARY CARE PROVIDER: Yanira Tejeda MD     ATTENDING PHYSICIAN: Dr. Abhishek Cat  DISCHARGING PROVIDER: Maribel Duron NP    To contact this individual call [phone] and ask the  to page. If unavailable ask to be transferred the Adult Hospitalist Department. CONSULTATIONS: ED CONSULT TO SENIOR SERVICES CASE MANAGEMENT  ED CONSULT TO SENIOR SERVICES NURSE PRACTITIONER  ED CONSULT TO SENIOR SERVICES PHARMACIST  IP CONSULT TO HOSPITALIST    PROCEDURES/SURGERIES: * No surgery found *    ADMITTING [address] [place] [address] COURSE:   Pt presented to the ED with fatigue and back pain. On initial assessment he was lethargic and didnt provide much history. Per his wife, he has chronic low back pain, but for the past 3 days he has been complaining of it more. She also noted that he had become increasingly fatigued and lethargic, and not eating as much. He is ambulatory with a walker at baseline but has been in a chair for most of the past 2 days. No documented fever, no known dysuria or hematuria. 12/4/2017  VSS, pt offers no complaints, very [place]. Wife at bedside this am.  Nitza Guaman completed with wife. He has received 5 days of ceftriaxone, should complete 2 more days of abx w/ omnicef. Pt has baseline memory impairment/confusion at baseline per wife. DISCHARGE DIAGNOSES / PLAN:      UTI (POA):  - ceftriaxone x 5 days, finish with 2 more days of omnicef  - e coli, pan-sensitive  - reportedly saw his urologist 2 weeks ago and was voiding normally      Lethargy and Acute Encephalopthy (POA): improved  - likely mild metabolic encephalopathy due to UTI   - now answers most of the questions. Wife reports he has troubles with current events and time at home.  Some confusion      Back pain: not significant factor this am  - chronic, due to DJD, possibly worsened by UTI  - acetaminophen PRN, capsaicin cream  - avoid opiates       Hypothyroidism: continue levothyroxine      Hyperlipidemia: continue atorvastatin      Hx CAD:  - s/p stenting, ASD repair, pacemaker placement, chronic atrial fib. - On  ASA, sotalol, Eliquis. - Consider stopping 1 blood thinner in an elderly patient (Probably just keep on Eliquis if ok with Cardiology). Can discuss this at next cardiology appt.      History of TIA: on asa and statin      History of prostate CA: s/p TURP      Code status: DDNR  DVT prophylaxis: [facility] discussed with: patient, attending MD  Disposition: acceptance to [address] [place] [address]       PENDING TEST RESULTS:   At the time of discharge the following test results are still pending: none    FOLLOW UP APPOINTMENTS:    Follow-up Information     Follow up With Details Comments Contact Info    Farheen Rubalcava MD  follow up post hospitalization [address]  [phone]             ADDITIONAL CARE RECOMMENDATIONS: as above    DIET:  cardiac    ACTIVITY: per rehab    WOUND CARE: none    EQUIPMENT needed: none      DISCHARGE MEDICATIONS:  Current Discharge Medication List      START taking these medications    Details   cefdinir (OMNICEF) 300 mg capsule Take 1 Cap by mouth two (2) times a day for 2 days. Qty: 4 Cap, Refills: 0         CONTINUE these medications which have NOT CHANGED    Details   aspirin delayed-release 81 mg tablet Take 81 mg by mouth daily. !! levothyroxine (SYNTHROID) 50 mcg tablet Take 50 mcg by mouth five (5) days a week. (100 mcg on Mon+Fri; 50 mcg x 5 days weekly)      !! levothyroxine (SYNTHROID) 50 mcg tablet Take 100 mcg by mouth every Monday and Friday. (100 mcg on Mon+Fri; 50 mcg x 5 days weekly)      simvastatin (ZOCOR) 10 mg tablet Take 10 mg by mouth nightly. memantine (NAMENDA) 10 mg tablet Take 10 mg by mouth two (2) times a day.       sotalol (SOTALOL AF) 80 mg tablet Take 1 Tab by mouth two (2) times a day. Qty: 60 Tab, Refills: 6      apixaban (ELIQUIS) 5 mg tablet Take 5 mg by mouth every twelve (12) hours. donepezil (ARICEPT) 10 mg tablet Take 10 mg by mouth daily (with breakfast). tolterodine (DETROL) 2 mg tablet Take 2 mg by mouth nightly. Indications: URINARY URGE INCONTINENCE       !! - Potential duplicate medications found. Please discuss with provider. NOTIFY YOUR PHYSICIAN FOR ANY OF THE FOLLOWING:   Fever over 101 degrees for 24 hours. Chest pain, shortness of breath, fever, chills, nausea, vomiting, diarrhea, change in mentation, falling, weakness, bleeding. Severe pain or pain not relieved by medications. Or, any other signs or symptoms that you may have questions about. DISPOSITION:    Home With:   OT  PT  HH  RN      xx Long term SNF/Inpatient Rehab    Independent/assisted living    Hospice    Other:       PATIENT CONDITION AT DISCHARGE:     Functional status    Poor    xx Deconditioned     Independent      Cognition     Lucid    xx Forgetful    xx Dementia      Catheters/lines (plus indication)    Skinner     PICC     PEG    xx None      Code status     Full code    xx DNR      PHYSICAL EXAMINATION AT DISCHARGE:  Constitutional:  No acute distress, cooperative, pleasant    ENT:  Oral mucous membranes moist. 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 Very [place], left hearing aide   Resp:  CTA bilaterally. No wheezing. No accessory muscle use, on RA   CV:  Regular rhythm, normal rate, no murmurs    GI:  Soft, non distended, non tender. normoactive bowel sounds +BM    Musculoskeletal:  No edema, warm, 2+ pulses throughout    Neurologic:  Moves all extremities.   AAOx2                        Psych:  Poor insight, Not anxious nor agitated.          CHRONIC MEDICAL DIAGNOSES:  Problem List as of 12/4/2017  Date Reviewed: 12/4/2017          Codes Class Noted - Resolved    * (Principal)UTI (urinary tract infection) ICD-10-CM: N39.0  ICD-9-CM: 599.0  11/30/2017 - Present        Altered mental status ICD-10-CM: R41.82  ICD-9-CM: 780.97  5/4/2015 - Present        Hemorrhage of right kidney ICD-10-CM: N28.89  ICD-9-CM: 593.81  5/4/2015 - Present        Vertigo ICD-10-CM: S94  ICD-9-CM: 780.4  5/27/2013 - Present        Melanoma in situ of back ([facility] [address] ICD-10-CM: D03.59  ICD-9-CM: 172.5  3/4/2013 - Present        [facility] ([facility] ICD-10-CM: C43.60  ICD-9-CM: 172.6  3/4/2013 - Present        Sialoadenitis ICD-10-CM: K11.20  ICD-9-CM: 527.2  5/2/2012 - Present        Exercise habits ICD-9-CM: GTK7658  Unknown - Present    Overview Signed 5/2/2012  8:31 AM by Elian Smith DO     runs in park daily             Other ill-defined conditions(799.89) ICD-10-CM: R69  ICD-9-CM: 799.89  Unknown - Present    Overview Signed 5/1/2012  1:44 PM by Elian Smith DO     -WIFE WAS CALLED TO CONFIRM HISTORY             Peripheral neuropathy ICD-10-CM: G62.9  ICD-9-CM: 356.9  4/21/2010 - Present        Recurrent herpes simplex ICD-10-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  ICD-9-CM: 054.9  4/21/2010 - Present    Overview Signed 4/21/2010  4:38 PM by Kelin Lab     Chest/scalp             Prostate CA [facility]) ICD-10-CM: C61  ICD-9-CM: 185  4/21/2010 - Present    Overview Signed 4/21/2010  4:38 PM by Kelin Lab     radiation             Personal history of kidney stones ICD-10-CM: Z87.442  ICD-9-CM: V13.01  4/21/2010 - Present        History of melanoma ICD-10-CM: Z85.820  ICD-9-CM: V10.82  4/21/2010 - Present        Hypercholesteremia ICD-10-CM: E78.00  ICD-9-CM: 272.0  8/21/1992 - Present    Overview Signed 4/21/2010  4:39 PM by Kelin Lab     mild             RESOLVED: Hypoxia ICD-10-CM: R09.02  ICD-9-CM: 799.02  5/3/2012 - 5/5/2012        RESOLVED: Right leg weakness ICD-10-CM: R29.898  ICD-9-CM: 729.89  5/3/2012 - 5/5/2012        RESOLVED: Seizure disorder ([facility] [address] ICD-10-CM: G40.909  ICD-9-CM: 345.90  4/21/2010 - 1/19/2011    Overview Signed 4/21/2010  4:39 PM by Soy Asencio     2 degree to head injury in 1943             RESOLVED: Infarction of lymph node/vessel ICD-10-CM: I89.8  ICD-9-CM: 457.8  4/21/2010 - 1/19/2011    Overview Signed 4/21/2010  4:41 PM by Kaela Asencio     Small vessel infarct R mid-brain                   Greater than 30 minutes were spent with the patient on counseling and coordination of care    Signed:   Rachel Fowler V, NP  12/4/2017  11:05 AM      Pt seen and evaluated. Agree with above note, A/P. Pt will be D/[facility] to [facility].

## 2017-12-04 NOTE — PROGRESS NOTES
Chart reviewed. CM spoke with Sharmaine Joy at the [address] [place] [address] ([phone]). They are willing to accept patient and will have a bed available this afternoon after 1:30 PM. Spoke with NP, patient is medically stable for discharge to SNF today. CM met with patient and wife at bedside, they prefer ambulance transport. CM arranged ambulance transport with [facility], and they are able to accommodate 2 PM. Discharge paperwork was faxed to the [address] [place] [address] at [phone]. Maxine at the [address] [place] [address] is aware of transport time. Current discharge plan: Patient will discharge today to the [facility] for rehab. [facility] will transport patient via stretcher at 2 PM. Discharge folder is located on hard chart to include ambulance form and discharge instructions. RN to follow with Kurtis and MAR. RN to call report to #[phone] and ask for the [facility].

## 2017-12-04 NOTE — PROGRESS NOTES
Bedside and Verbal shift change report given to Kev Gaspar RN (oncoming nurse) by Radha Gordon RN (offgoing nurse). Report included the following information SBAR, Kardex, ED Summary, Intake/Output, MAR and Recent Results.